# Patient Record
Sex: FEMALE | Race: BLACK OR AFRICAN AMERICAN | NOT HISPANIC OR LATINO | Employment: OTHER | RURAL
[De-identification: names, ages, dates, MRNs, and addresses within clinical notes are randomized per-mention and may not be internally consistent; named-entity substitution may affect disease eponyms.]

---

## 2018-03-06 ENCOUNTER — HISTORICAL (OUTPATIENT)
Dept: ADMINISTRATIVE | Facility: HOSPITAL | Age: 73
End: 2018-03-06

## 2018-03-07 LAB
LAB AP CLINICAL INFORMATION: NORMAL
LAB AP DIAGNOSIS - HISTORICAL: NORMAL
LAB AP GROSS PATHOLOGY - HISTORICAL: NORMAL
LAB AP SPECIMEN SUBMITTED - HISTORICAL: NORMAL

## 2018-03-27 ENCOUNTER — HISTORICAL (OUTPATIENT)
Dept: ADMINISTRATIVE | Facility: HOSPITAL | Age: 73
End: 2018-03-27

## 2018-03-28 LAB
LAB AP CLINICAL INFORMATION: NORMAL
LAB AP COMMENTS: NORMAL
LAB AP DIAGNOSIS - HISTORICAL: NORMAL
LAB AP GROSS PATHOLOGY - HISTORICAL: NORMAL
LAB AP SPECIMEN SUBMITTED - HISTORICAL: NORMAL

## 2019-03-27 ENCOUNTER — HISTORICAL (OUTPATIENT)
Dept: ADMINISTRATIVE | Facility: HOSPITAL | Age: 74
End: 2019-03-27

## 2019-03-28 LAB
LAB AP CLINICAL INFORMATION: NORMAL
LAB AP GENERAL CAT - HISTORICAL: NORMAL
LAB AP INTERPRETATION/RESULT - HISTORICAL: NEGATIVE
LAB AP SPECIMEN ADEQUACY - HISTORICAL: NORMAL
LAB AP SPECIMEN SUBMITTED - HISTORICAL: NORMAL

## 2019-07-09 ENCOUNTER — HISTORICAL (OUTPATIENT)
Dept: ADMINISTRATIVE | Facility: HOSPITAL | Age: 74
End: 2019-07-09

## 2021-01-21 ENCOUNTER — HISTORICAL (OUTPATIENT)
Dept: ADMINISTRATIVE | Facility: HOSPITAL | Age: 76
End: 2021-01-21

## 2021-01-21 LAB
ALBUMIN SERPL BCP-MCNC: 3.1 G/DL (ref 3.5–5)
ALBUMIN/GLOB SERPL: 0.6 {RATIO}
ALP SERPL-CCNC: 124 U/L (ref 55–142)
ALT SERPL W P-5'-P-CCNC: 15 U/L (ref 13–56)
ANION GAP SERPL CALCULATED.3IONS-SCNC: 14 MMOL/L
ANISOCYTOSIS BLD QL SMEAR: ABNORMAL
AST SERPL W P-5'-P-CCNC: 14 U/L (ref 15–37)
BASOPHILS # BLD AUTO: 0.04 X10E3/UL (ref 0–0.2)
BASOPHILS NFR BLD AUTO: 0.3 % (ref 0–1)
BILIRUB SERPL-MCNC: 0.2 MG/DL (ref 0–1.2)
BUN SERPL-MCNC: 26 MG/DL (ref 7–18)
BUN/CREAT SERPL: 18.1
CALCIUM SERPL-MCNC: 9.8 MG/DL (ref 8.5–10.1)
CHLORIDE SERPL-SCNC: 103 MMOL/L (ref 98–107)
CK MB SERPL-MCNC: <1 NG/ML (ref 1–3.6)
CK SERPL-CCNC: 113 U/L (ref 26–192)
CO2 SERPL-SCNC: 26 MMOL/L (ref 21–32)
CREAT SERPL-MCNC: 1.44 MG/DL (ref 0.55–1.02)
EOSINOPHIL # BLD AUTO: 0.05 X10E3/UL (ref 0–0.5)
EOSINOPHIL NFR BLD AUTO: 0.3 % (ref 1–4)
ERYTHROCYTE [DISTWIDTH] IN BLOOD BY AUTOMATED COUNT: 16.3 % (ref 11.5–14.5)
GLOBULIN SER-MCNC: 4.8 G/DL (ref 2–4)
GLUCOSE SERPL-MCNC: 122 MG/DL (ref 70–105)
GLUCOSE SERPL-MCNC: 145 MG/DL (ref 74–106)
HCT VFR BLD AUTO: 33.5 % (ref 38–47)
HCT VFR BLD AUTO: 33.9 % (ref 38–47)
HCT VFR BLD AUTO: 37.7 % (ref 38–47)
HGB BLD-MCNC: 10.7 G/DL (ref 12–16)
HGB BLD-MCNC: 9.7 G/DL (ref 12–16)
HGB BLD-MCNC: 9.7 G/DL (ref 12–16)
HYPOCHROMIA BLD QL SMEAR: ABNORMAL
IMM GRANULOCYTES # BLD AUTO: 0.07 X10E3/UL (ref 0–0.04)
IMM GRANULOCYTES NFR BLD: 0.5 % (ref 0–0.4)
LYMPHOCYTES # BLD AUTO: 1.09 X10E3/UL (ref 1–4.8)
LYMPHOCYTES NFR BLD AUTO: 7.2 % (ref 27–41)
MAGNESIUM SERPL-MCNC: 2.2 MG/DL (ref 1.7–2.3)
MCH RBC QN AUTO: 24.9 PG (ref 27–31)
MCHC RBC AUTO-ENTMCNC: 28.4 G/DL (ref 32–36)
MCHC RBC AUTO-ENTMCNC: 28.6 G/DL (ref 32–36)
MCHC RBC AUTO-ENTMCNC: 29 G/DL (ref 32–36)
MCV RBC AUTO: 87.7 FL (ref 80–96)
MONOCYTES # BLD AUTO: 0.75 X10E3/UL (ref 0–0.8)
MONOCYTES NFR BLD AUTO: 4.9 % (ref 2–6)
MPC BLD CALC-MCNC: 11.7 FL (ref 9.4–12.4)
MYOGLOBIN SERPL-MCNC: 118 NG/ML (ref 13–71)
NEUTROPHILS # BLD AUTO: 13.21 X10E3/UL (ref 1.8–7.7)
NEUTROPHILS NFR BLD AUTO: 86.8 % (ref 53–65)
NRBC # BLD AUTO: 0 X10E3/UL (ref 0–0)
NRBC, AUTO (.00): 0 /100 (ref 0–0)
NT-PROBNP SERPL-MCNC: 101 PG/ML (ref 1–125)
PLATELET # BLD AUTO: 462 X10E3/UL (ref 150–400)
PLATELET MORPHOLOGY: ABNORMAL
POLYCHROMASIA BLD QL SMEAR: ABNORMAL
POTASSIUM SERPL-SCNC: 4.5 MMOL/L (ref 3.5–5.1)
PROT SERPL-MCNC: 7.9 G/DL (ref 6.4–8.2)
RBC # BLD AUTO: 4.3 X10E6/UL (ref 4.2–5.4)
SARS-COV-2 RNA AMPLIFICATION, QUAL: NEGATIVE
SODIUM SERPL-SCNC: 138 MMOL/L (ref 136–145)
TROPONIN I SERPL-MCNC: <0.017 NG/ML (ref 0–0.06)
WBC # BLD AUTO: 15.21 X10E3/UL (ref 4.5–11)

## 2021-01-22 ENCOUNTER — HISTORICAL (OUTPATIENT)
Dept: ADMINISTRATIVE | Facility: HOSPITAL | Age: 76
End: 2021-01-22

## 2021-01-22 LAB
ABO: NORMAL
ANION GAP SERPL CALCULATED.3IONS-SCNC: 13 MMOL/L
ANTIBODY SCREEN: NORMAL
APTT PPP: 32.6 SECONDS (ref 25.2–37.3)
BASOPHILS # BLD AUTO: 0.04 X10E3/UL (ref 0–0.2)
BASOPHILS NFR BLD AUTO: 0.3 % (ref 0–1)
BUN SERPL-MCNC: 26 MG/DL (ref 7–18)
CALCIUM SERPL-MCNC: 9.3 MG/DL (ref 8.5–10.1)
CHLORIDE SERPL-SCNC: 104 MMOL/L (ref 98–107)
CO2 SERPL-SCNC: 26 MMOL/L (ref 21–32)
CREAT SERPL-MCNC: 1.66 MG/DL (ref 0.55–1.02)
EOSINOPHIL # BLD AUTO: 0.11 X10E3/UL (ref 0–0.5)
EOSINOPHIL NFR BLD AUTO: 0.7 % (ref 1–4)
ERYTHROCYTE [DISTWIDTH] IN BLOOD BY AUTOMATED COUNT: 16.2 % (ref 11.5–14.5)
GLUCOSE SERPL-MCNC: 123 MG/DL (ref 70–105)
GLUCOSE SERPL-MCNC: 126 MG/DL (ref 74–106)
GLUCOSE SERPL-MCNC: 131 MG/DL (ref 70–105)
GLUCOSE SERPL-MCNC: 137 MG/DL (ref 70–105)
HCT VFR BLD AUTO: 33.5 % (ref 38–47)
HCT VFR BLD AUTO: 34.1 % (ref 38–47)
HGB BLD-MCNC: 9.6 G/DL (ref 12–16)
HGB BLD-MCNC: 9.8 G/DL (ref 12–16)
IMM GRANULOCYTES # BLD AUTO: 0.07 X10E3/UL (ref 0–0.04)
IMM GRANULOCYTES NFR BLD: 0.5 % (ref 0–0.4)
INR BLD: 1 (ref 0–3.3)
LYMPHOCYTES # BLD AUTO: 1.55 X10E3/UL (ref 1–4.8)
LYMPHOCYTES NFR BLD AUTO: 10.1 % (ref 27–41)
MCH RBC QN AUTO: 25 PG (ref 27–31)
MCHC RBC AUTO-ENTMCNC: 28.7 G/DL (ref 32–36)
MCHC RBC AUTO-ENTMCNC: 28.7 G/DL (ref 32–36)
MCV RBC AUTO: 87.2 FL (ref 80–96)
MONOCYTES # BLD AUTO: 0.85 X10E3/UL (ref 0–0.8)
MONOCYTES NFR BLD AUTO: 5.5 % (ref 2–6)
MPC BLD CALC-MCNC: 11.5 FL (ref 9.4–12.4)
NEUTROPHILS # BLD AUTO: 12.75 X10E3/UL (ref 1.8–7.7)
NEUTROPHILS NFR BLD AUTO: 82.9 % (ref 53–65)
NRBC # BLD AUTO: 0 X10E3/UL (ref 0–0)
NRBC, AUTO (.00): 0 /100 (ref 0–0)
PLATELET # BLD AUTO: 414 X10E3/UL (ref 150–400)
POTASSIUM SERPL-SCNC: 4.4 MMOL/L (ref 3.5–5.1)
PROTHROMBIN TIME: 12.7 SECONDS (ref 11.7–14.7)
RBC # BLD AUTO: 3.84 X10E6/UL (ref 4.2–5.4)
RH TYPE: NORMAL
SODIUM SERPL-SCNC: 139 MMOL/L (ref 136–145)
WBC # BLD AUTO: 15.37 X10E3/UL (ref 4.5–11)

## 2021-01-23 ENCOUNTER — HISTORICAL (OUTPATIENT)
Dept: ADMINISTRATIVE | Facility: HOSPITAL | Age: 76
End: 2021-01-23

## 2021-01-23 LAB
ANION GAP SERPL CALCULATED.3IONS-SCNC: 15 MMOL/L
ANISOCYTOSIS BLD QL SMEAR: ABNORMAL
BASOPHILS # BLD AUTO: 0.04 X10E3/UL (ref 0–0.2)
BASOPHILS # BLD AUTO: 0.05 X10E3/UL (ref 0–0.2)
BASOPHILS NFR BLD AUTO: 0.3 % (ref 0–1)
BASOPHILS NFR BLD AUTO: 0.4 % (ref 0–1)
BUN SERPL-MCNC: 28 MG/DL (ref 7–18)
CALCIUM SERPL-MCNC: 9.4 MG/DL (ref 8.5–10.1)
CHLORIDE SERPL-SCNC: 102 MMOL/L (ref 98–107)
CO2 SERPL-SCNC: 26 MMOL/L (ref 21–32)
CREAT SERPL-MCNC: 1.76 MG/DL (ref 0.55–1.02)
EOSINOPHIL # BLD AUTO: 0.13 X10E3/UL (ref 0–0.5)
EOSINOPHIL # BLD AUTO: 0.13 X10E3/UL (ref 0–0.5)
EOSINOPHIL NFR BLD AUTO: 1.1 % (ref 1–4)
EOSINOPHIL NFR BLD AUTO: 1.1 % (ref 1–4)
ERYTHROCYTE [DISTWIDTH] IN BLOOD BY AUTOMATED COUNT: 16.3 % (ref 11.5–14.5)
ERYTHROCYTE [DISTWIDTH] IN BLOOD BY AUTOMATED COUNT: 16.4 % (ref 11.5–14.5)
GLUCOSE SERPL-MCNC: 115 MG/DL (ref 70–105)
GLUCOSE SERPL-MCNC: 129 MG/DL (ref 74–106)
GLUCOSE SERPL-MCNC: 138 MG/DL (ref 70–105)
HCT VFR BLD AUTO: 33.5 % (ref 38–47)
HCT VFR BLD AUTO: 36.8 % (ref 38–47)
HGB BLD-MCNC: 10.5 G/DL (ref 12–16)
HGB BLD-MCNC: 9.7 G/DL (ref 12–16)
HYPOCHROMIA BLD QL SMEAR: SLIGHT
IMM GRANULOCYTES # BLD AUTO: 0.04 X10E3/UL (ref 0–0.04)
IMM GRANULOCYTES # BLD AUTO: 0.06 X10E3/UL (ref 0–0.04)
IMM GRANULOCYTES NFR BLD: 0.3 % (ref 0–0.4)
IMM GRANULOCYTES NFR BLD: 0.5 % (ref 0–0.4)
LYMPHOCYTES # BLD AUTO: 1.29 X10E3/UL (ref 1–4.8)
LYMPHOCYTES # BLD AUTO: 1.55 X10E3/UL (ref 1–4.8)
LYMPHOCYTES NFR BLD AUTO: 10.9 % (ref 27–41)
LYMPHOCYTES NFR BLD AUTO: 13.5 % (ref 27–41)
MAGNESIUM SERPL-MCNC: 2 MG/DL (ref 1.7–2.3)
MCH RBC QN AUTO: 24.8 PG (ref 27–31)
MCH RBC QN AUTO: 25.1 PG (ref 27–31)
MCHC RBC AUTO-ENTMCNC: 28.5 G/DL (ref 32–36)
MCHC RBC AUTO-ENTMCNC: 29 G/DL (ref 32–36)
MCV RBC AUTO: 85.7 FL (ref 80–96)
MCV RBC AUTO: 87.8 FL (ref 80–96)
MONOCYTES # BLD AUTO: 0.59 X10E3/UL (ref 0–0.8)
MONOCYTES # BLD AUTO: 0.66 X10E3/UL (ref 0–0.8)
MONOCYTES NFR BLD AUTO: 5.1 % (ref 2–6)
MONOCYTES NFR BLD AUTO: 5.6 % (ref 2–6)
MPC BLD CALC-MCNC: 11.7 FL (ref 9.4–12.4)
MPC BLD CALC-MCNC: 11.8 FL (ref 9.4–12.4)
NEUTROPHILS # BLD AUTO: 9.13 X10E3/UL (ref 1.8–7.7)
NEUTROPHILS # BLD AUTO: 9.63 X10E3/UL (ref 1.8–7.7)
NEUTROPHILS NFR BLD AUTO: 79.6 % (ref 53–65)
NEUTROPHILS NFR BLD AUTO: 81.6 % (ref 53–65)
NRBC # BLD AUTO: 0 X10E3/UL (ref 0–0)
NRBC # BLD AUTO: 0 X10E3/UL (ref 0–0)
NRBC, AUTO (.00): 0 /100 (ref 0–0)
NRBC, AUTO (.00): 0 /100 (ref 0–0)
PLATELET # BLD AUTO: 414 X10E3/UL (ref 150–400)
PLATELET # BLD AUTO: 438 X10E3/UL (ref 150–400)
PLATELET MORPHOLOGY: ABNORMAL
POLYCHROMASIA BLD QL SMEAR: ABNORMAL
POTASSIUM SERPL-SCNC: 4.4 MMOL/L (ref 3.5–5.1)
RBC # BLD AUTO: 3.91 X10E6/UL (ref 4.2–5.4)
RBC # BLD AUTO: 4.19 X10E6/UL (ref 4.2–5.4)
SODIUM SERPL-SCNC: 139 MMOL/L (ref 136–145)
WBC # BLD AUTO: 11.49 X10E3/UL (ref 4.5–11)
WBC # BLD AUTO: 11.81 X10E3/UL (ref 4.5–11)

## 2021-01-26 LAB

## 2021-06-23 PROBLEM — K21.9 GASTROESOPHAGEAL REFLUX DISEASE: Status: ACTIVE | Noted: 2021-06-23

## 2021-06-23 PROBLEM — I10 HYPERTENSION: Status: ACTIVE | Noted: 2021-06-23

## 2021-06-23 PROBLEM — J30.9 ALLERGIC RHINITIS: Status: ACTIVE | Noted: 2021-06-23

## 2021-06-23 RX ORDER — MONTELUKAST SODIUM 10 MG/1
1 TABLET ORAL DAILY
COMMUNITY

## 2021-06-23 RX ORDER — FERROUS SULFATE 325(65) MG
1 TABLET ORAL 2 TIMES DAILY
COMMUNITY
End: 2021-10-18

## 2021-06-23 RX ORDER — LOSARTAN POTASSIUM AND HYDROCHLOROTHIAZIDE 12.5; 1 MG/1; MG/1
1 TABLET ORAL DAILY
COMMUNITY
End: 2022-02-21

## 2021-06-23 RX ORDER — THEOPHYLLINE 400 MG/1
1 TABLET, EXTENDED RELEASE ORAL DAILY
COMMUNITY

## 2021-06-23 RX ORDER — HYDROCHLOROTHIAZIDE 25 MG/1
1 TABLET ORAL DAILY
COMMUNITY

## 2021-06-23 RX ORDER — BUDESONIDE AND FORMOTEROL FUMARATE DIHYDRATE 160; 4.5 UG/1; UG/1
2 AEROSOL RESPIRATORY (INHALATION) 2 TIMES DAILY
COMMUNITY

## 2021-06-23 RX ORDER — PANTOPRAZOLE SODIUM 40 MG/1
1 TABLET, DELAYED RELEASE ORAL DAILY
COMMUNITY
End: 2021-07-27 | Stop reason: SDUPTHER

## 2021-06-23 RX ORDER — AMLODIPINE BESYLATE 5 MG/1
1 TABLET ORAL DAILY
COMMUNITY
End: 2021-10-18

## 2021-06-23 RX ORDER — ALBUTEROL SULFATE 90 UG/1
2 AEROSOL, METERED RESPIRATORY (INHALATION) EVERY 6 HOURS PRN
COMMUNITY

## 2021-06-23 RX ORDER — IPRATROPIUM BROMIDE AND ALBUTEROL SULFATE 2.5; .5 MG/3ML; MG/3ML
3 SOLUTION RESPIRATORY (INHALATION) EVERY 6 HOURS PRN
COMMUNITY

## 2021-06-24 ENCOUNTER — OFFICE VISIT (OUTPATIENT)
Dept: PRIMARY CARE CLINIC | Facility: CLINIC | Age: 76
End: 2021-06-24
Payer: COMMERCIAL

## 2021-06-24 VITALS
DIASTOLIC BLOOD PRESSURE: 58 MMHG | BODY MASS INDEX: 45.99 KG/M2 | HEIGHT: 67 IN | SYSTOLIC BLOOD PRESSURE: 116 MMHG | WEIGHT: 293 LBS | RESPIRATION RATE: 18 BRPM | HEART RATE: 82 BPM | OXYGEN SATURATION: 94 %

## 2021-06-24 DIAGNOSIS — I10 HYPERTENSION, UNSPECIFIED TYPE: Primary | ICD-10-CM

## 2021-06-24 DIAGNOSIS — K21.9 GASTROESOPHAGEAL REFLUX DISEASE, UNSPECIFIED WHETHER ESOPHAGITIS PRESENT: ICD-10-CM

## 2021-06-24 DIAGNOSIS — J30.9 ALLERGIC RHINITIS, UNSPECIFIED SEASONALITY, UNSPECIFIED TRIGGER: ICD-10-CM

## 2021-06-24 PROCEDURE — 99214 OFFICE O/P EST MOD 30 MIN: CPT | Mod: ,,, | Performed by: FAMILY MEDICINE

## 2021-06-24 PROCEDURE — 99214 PR OFFICE/OUTPT VISIT, EST, LEVL IV, 30-39 MIN: ICD-10-PCS | Mod: ,,, | Performed by: FAMILY MEDICINE

## 2021-07-27 ENCOUNTER — OFFICE VISIT (OUTPATIENT)
Dept: PRIMARY CARE CLINIC | Facility: CLINIC | Age: 76
End: 2021-07-27
Payer: COMMERCIAL

## 2021-07-27 VITALS
TEMPERATURE: 98 F | DIASTOLIC BLOOD PRESSURE: 72 MMHG | WEIGHT: 293 LBS | OXYGEN SATURATION: 94 % | SYSTOLIC BLOOD PRESSURE: 138 MMHG | HEART RATE: 97 BPM | BODY MASS INDEX: 45.99 KG/M2 | HEIGHT: 67 IN | RESPIRATION RATE: 16 BRPM

## 2021-07-27 DIAGNOSIS — E66.01 CLASS 3 SEVERE OBESITY DUE TO EXCESS CALORIES WITH SERIOUS COMORBIDITY AND BODY MASS INDEX (BMI) OF 50.0 TO 59.9 IN ADULT: ICD-10-CM

## 2021-07-27 DIAGNOSIS — I10 HYPERTENSION, UNSPECIFIED TYPE: Primary | ICD-10-CM

## 2021-07-27 DIAGNOSIS — J30.9 ALLERGIC RHINITIS, UNSPECIFIED SEASONALITY, UNSPECIFIED TRIGGER: Primary | ICD-10-CM

## 2021-07-27 DIAGNOSIS — I10 HYPERTENSION, UNSPECIFIED TYPE: ICD-10-CM

## 2021-07-27 DIAGNOSIS — K21.9 GASTROESOPHAGEAL REFLUX DISEASE, UNSPECIFIED WHETHER ESOPHAGITIS PRESENT: ICD-10-CM

## 2021-07-27 PROBLEM — E66.09 OBESITY DUE TO EXCESS CALORIES: Status: ACTIVE | Noted: 2021-07-27

## 2021-07-27 PROCEDURE — 99214 OFFICE O/P EST MOD 30 MIN: CPT | Mod: ,,, | Performed by: FAMILY MEDICINE

## 2021-07-27 PROCEDURE — 99214 PR OFFICE/OUTPT VISIT, EST, LEVL IV, 30-39 MIN: ICD-10-PCS | Mod: ,,, | Performed by: FAMILY MEDICINE

## 2021-07-27 RX ORDER — PANTOPRAZOLE SODIUM 40 MG/1
40 TABLET, DELAYED RELEASE ORAL DAILY
Qty: 90 TABLET | Refills: 3 | Status: SHIPPED | OUTPATIENT
Start: 2021-07-27 | End: 2021-10-25 | Stop reason: SDUPTHER

## 2021-07-27 RX ORDER — HYDROXYZINE PAMOATE 25 MG/1
25 CAPSULE ORAL EVERY 8 HOURS PRN
Qty: 30 CAPSULE | Refills: 11 | Status: SHIPPED | OUTPATIENT
Start: 2021-07-27

## 2021-07-27 RX ORDER — PHENTERMINE HYDROCHLORIDE 37.5 MG/1
37.5 CAPSULE ORAL EVERY MORNING
Qty: 30 CAPSULE | Refills: 0 | Status: SHIPPED | OUTPATIENT
Start: 2021-07-27 | End: 2021-08-26

## 2021-10-11 RX ORDER — PHENTERMINE HYDROCHLORIDE 37.5 MG/1
37.5 CAPSULE ORAL EVERY MORNING
Qty: 90 CAPSULE | Refills: 3 | Status: SHIPPED | OUTPATIENT
Start: 2021-10-11 | End: 2021-11-10

## 2021-10-12 ENCOUNTER — PATIENT OUTREACH (OUTPATIENT)
Dept: PRIMARY CARE CLINIC | Facility: CLINIC | Age: 76
End: 2021-10-12

## 2021-10-18 RX ORDER — FERROUS SULFATE TAB 325 MG (65 MG ELEMENTAL FE) 325 (65 FE) MG
TAB ORAL
Qty: 270 TABLET | Refills: 2 | Status: SHIPPED | OUTPATIENT
Start: 2021-10-18 | End: 2022-07-18

## 2021-10-18 RX ORDER — AMLODIPINE BESYLATE 5 MG/1
TABLET ORAL
Qty: 90 TABLET | Refills: 2 | Status: SHIPPED | OUTPATIENT
Start: 2021-10-18 | End: 2022-07-18

## 2021-10-25 ENCOUNTER — OFFICE VISIT (OUTPATIENT)
Dept: PRIMARY CARE CLINIC | Facility: CLINIC | Age: 76
End: 2021-10-25
Payer: COMMERCIAL

## 2021-10-25 VITALS
WEIGHT: 293 LBS | OXYGEN SATURATION: 95 % | HEIGHT: 67 IN | BODY MASS INDEX: 45.99 KG/M2 | HEART RATE: 94 BPM | SYSTOLIC BLOOD PRESSURE: 138 MMHG | DIASTOLIC BLOOD PRESSURE: 68 MMHG | RESPIRATION RATE: 18 BRPM

## 2021-10-25 DIAGNOSIS — J30.9 ALLERGIC RHINITIS, UNSPECIFIED SEASONALITY, UNSPECIFIED TRIGGER: Primary | ICD-10-CM

## 2021-10-25 DIAGNOSIS — E66.01 CLASS 3 SEVERE OBESITY DUE TO EXCESS CALORIES WITH SERIOUS COMORBIDITY AND BODY MASS INDEX (BMI) OF 50.0 TO 59.9 IN ADULT: ICD-10-CM

## 2021-10-25 DIAGNOSIS — Z23 NEED FOR INFLUENZA VACCINATION: ICD-10-CM

## 2021-10-25 DIAGNOSIS — K21.9 GASTROESOPHAGEAL REFLUX DISEASE, UNSPECIFIED WHETHER ESOPHAGITIS PRESENT: ICD-10-CM

## 2021-10-25 DIAGNOSIS — I10 HYPERTENSION, UNSPECIFIED TYPE: ICD-10-CM

## 2021-10-25 PROCEDURE — 99214 OFFICE O/P EST MOD 30 MIN: CPT | Mod: 25,,, | Performed by: FAMILY MEDICINE

## 2021-10-25 PROCEDURE — G0008 FLU VACCINE - QUADRIVALENT - HIGH DOSE (65+) PRESERVATIVE FREE IM: ICD-10-PCS | Mod: ,,, | Performed by: FAMILY MEDICINE

## 2021-10-25 PROCEDURE — 99214 PR OFFICE/OUTPT VISIT, EST, LEVL IV, 30-39 MIN: ICD-10-PCS | Mod: 25,,, | Performed by: FAMILY MEDICINE

## 2021-10-25 PROCEDURE — 90662 FLU VACCINE - QUADRIVALENT - HIGH DOSE (65+) PRESERVATIVE FREE IM: ICD-10-PCS | Mod: ,,, | Performed by: FAMILY MEDICINE

## 2021-10-25 PROCEDURE — 90662 IIV NO PRSV INCREASED AG IM: CPT | Mod: ,,, | Performed by: FAMILY MEDICINE

## 2021-10-25 PROCEDURE — G0008 ADMIN INFLUENZA VIRUS VAC: HCPCS | Mod: ,,, | Performed by: FAMILY MEDICINE

## 2021-10-25 RX ORDER — PANTOPRAZOLE SODIUM 40 MG/1
40 TABLET, DELAYED RELEASE ORAL DAILY
Qty: 90 TABLET | Refills: 3 | Status: SHIPPED | OUTPATIENT
Start: 2021-10-25 | End: 2022-01-20 | Stop reason: SDUPTHER

## 2021-10-25 RX ORDER — PHENTERMINE HYDROCHLORIDE 37.5 MG/1
37.5 CAPSULE ORAL EVERY MORNING
Qty: 30 CAPSULE | Refills: 1 | Status: SHIPPED | OUTPATIENT
Start: 2021-10-25 | End: 2021-11-24

## 2022-01-25 ENCOUNTER — OFFICE VISIT (OUTPATIENT)
Dept: PRIMARY CARE CLINIC | Facility: CLINIC | Age: 77
End: 2022-01-25
Payer: COMMERCIAL

## 2022-01-25 VITALS
HEART RATE: 77 BPM | WEIGHT: 293 LBS | BODY MASS INDEX: 45.99 KG/M2 | RESPIRATION RATE: 18 BRPM | OXYGEN SATURATION: 95 % | SYSTOLIC BLOOD PRESSURE: 120 MMHG | DIASTOLIC BLOOD PRESSURE: 74 MMHG | HEIGHT: 67 IN

## 2022-01-25 DIAGNOSIS — E11.9 TYPE 2 DIABETES MELLITUS WITHOUT COMPLICATION, WITHOUT LONG-TERM CURRENT USE OF INSULIN: Primary | ICD-10-CM

## 2022-01-25 DIAGNOSIS — E66.01 CLASS 3 SEVERE OBESITY DUE TO EXCESS CALORIES WITH SERIOUS COMORBIDITY AND BODY MASS INDEX (BMI) OF 50.0 TO 59.9 IN ADULT: ICD-10-CM

## 2022-01-25 DIAGNOSIS — Z23 NEED FOR INFLUENZA VACCINATION: ICD-10-CM

## 2022-01-25 DIAGNOSIS — K21.9 GASTROESOPHAGEAL REFLUX DISEASE, UNSPECIFIED WHETHER ESOPHAGITIS PRESENT: ICD-10-CM

## 2022-01-25 DIAGNOSIS — J30.9 ALLERGIC RHINITIS, UNSPECIFIED SEASONALITY, UNSPECIFIED TRIGGER: ICD-10-CM

## 2022-01-25 PROCEDURE — 1159F MED LIST DOCD IN RCRD: CPT | Mod: ,,, | Performed by: FAMILY MEDICINE

## 2022-01-25 PROCEDURE — 1159F PR MEDICATION LIST DOCUMENTED IN MEDICAL RECORD: ICD-10-PCS | Mod: ,,, | Performed by: FAMILY MEDICINE

## 2022-01-25 PROCEDURE — 99214 PR OFFICE/OUTPT VISIT, EST, LEVL IV, 30-39 MIN: ICD-10-PCS | Mod: ,,, | Performed by: FAMILY MEDICINE

## 2022-01-25 PROCEDURE — 3074F PR MOST RECENT SYSTOLIC BLOOD PRESSURE < 130 MM HG: ICD-10-PCS | Mod: ,,, | Performed by: FAMILY MEDICINE

## 2022-01-25 PROCEDURE — 1101F PT FALLS ASSESS-DOCD LE1/YR: CPT | Mod: ,,, | Performed by: FAMILY MEDICINE

## 2022-01-25 PROCEDURE — 3288F FALL RISK ASSESSMENT DOCD: CPT | Mod: ,,, | Performed by: FAMILY MEDICINE

## 2022-01-25 PROCEDURE — 1101F PR PT FALLS ASSESS DOC 0-1 FALLS W/OUT INJ PAST YR: ICD-10-PCS | Mod: ,,, | Performed by: FAMILY MEDICINE

## 2022-01-25 PROCEDURE — 3078F DIAST BP <80 MM HG: CPT | Mod: ,,, | Performed by: FAMILY MEDICINE

## 2022-01-25 PROCEDURE — 3078F PR MOST RECENT DIASTOLIC BLOOD PRESSURE < 80 MM HG: ICD-10-PCS | Mod: ,,, | Performed by: FAMILY MEDICINE

## 2022-01-25 PROCEDURE — 3288F PR FALLS RISK ASSESSMENT DOCUMENTED: ICD-10-PCS | Mod: ,,, | Performed by: FAMILY MEDICINE

## 2022-01-25 PROCEDURE — 3074F SYST BP LT 130 MM HG: CPT | Mod: ,,, | Performed by: FAMILY MEDICINE

## 2022-01-25 PROCEDURE — 99214 OFFICE O/P EST MOD 30 MIN: CPT | Mod: ,,, | Performed by: FAMILY MEDICINE

## 2022-01-25 RX ORDER — PANTOPRAZOLE SODIUM 40 MG/1
40 TABLET, DELAYED RELEASE ORAL DAILY
Qty: 90 TABLET | Refills: 3 | Status: SHIPPED | OUTPATIENT
Start: 2022-01-25 | End: 2022-04-25

## 2022-01-25 NOTE — PROGRESS NOTES
Subjective:      Patient ID: Domi Singh is a 76 y.o. female.    Chief Complaint: Follow-up (3mon. Ck-up) and Hypertension    Domi Singh a 76 y.o. female presents for follow up on all regular problems which are reviewed and discussed.     Problem List Items Addressed This Visit        ID    Need for influenza vaccination       Endocrine    Obesity due to excess calories       GI    Gastroesophageal reflux disease       Other    Allergic rhinitis      Other Visit Diagnoses     Type 2 diabetes mellitus without complication, without long-term current use of insulin    -  Primary    Relevant Orders    Hemoglobin A1C    CBC Auto Differential    Comprehensive Metabolic Panel    Lipid Panel    TSH    Urinalysis          Past Medical History:  Past Medical History:   Diagnosis Date    Allergy     GERD (gastroesophageal reflux disease)     Hypertension     Obesities, morbid      Past Surgical History:   Procedure Laterality Date    CATARACT EXTRACTION, BILATERAL       Review of patient's allergies indicates:  No Known Allergies  Current Outpatient Medications on File Prior to Visit   Medication Sig Dispense Refill    albuterol (PROVENTIL/VENTOLIN HFA) 90 mcg/actuation inhaler Inhale 2 puffs into the lungs every 6 (six) hours as needed. Rescue      albuterol-ipratropium (DUO-NEB) 2.5 mg-0.5 mg/3 mL nebulizer solution Take 3 mLs by nebulization every 6 (six) hours as needed. Rescue      amLODIPine (NORVASC) 5 MG tablet Take 1 tablet by mouth daily. 90 tablet 2    budesonide-formoterol 160-4.5 mcg (SYMBICORT) 160-4.5 mcg/actuation HFAA Inhale 2 puffs into the lungs 2 (two) times a day. Controller      ergocalciferol (ERGOCALCIFEROL) 50,000 unit Cap Take 1 capsule by mouth weekly. 12 capsule 2    FEROSUL 325 mg (65 mg iron) Tab tablet Take 1 tablet by mouth three times daily. 270 tablet 2    hydroCHLOROthiazide (HYDRODIURIL) 25 MG tablet Take 1 tablet by mouth once daily.      hydrOXYzine pamoate (VISTARIL) 25  "MG Cap Take 1 capsule (25 mg total) by mouth every 8 (eight) hours as needed (nerves). 30 capsule 11    losartan-hydrochlorothiazide 100-12.5 mg (HYZAAR) 100-12.5 mg Tab Take 1 tablet by mouth once daily.      montelukast (SINGULAIR) 10 mg tablet Take 1 tablet by mouth once daily.      theophylline 400 mg Tb24 Take 1 tablet by mouth once daily.      [DISCONTINUED] pantoprazole (PROTONIX) 40 MG tablet Take 1 tablet by mouth daily. 90 tablet 3     No current facility-administered medications on file prior to visit.     Social History     Socioeconomic History    Marital status:    Tobacco Use    Smoking status: Former Smoker     Types: Cigarettes    Smokeless tobacco: Never Used   Substance and Sexual Activity    Alcohol use: Never    Drug use: Never     Family History   Problem Relation Age of Onset    Diabetes Mother     Hypertension Mother     Diabetes Father     Hypertension Father     Diabetes Sister     Hypertension Sister     Hypertension Brother        Review of Systems   Constitutional: Negative.  Negative for activity change, appetite change, chills and diaphoresis.   HENT: Negative.  Negative for congestion, ear pain, hearing loss and postnasal drip.    Eyes: Negative for itching.   Respiratory: Negative for chest tightness and shortness of breath.    Cardiovascular: Negative for chest pain.   Gastrointestinal: Negative for abdominal pain.   Endocrine: Negative for polydipsia.   Genitourinary: Negative for frequency.   Musculoskeletal: Negative for back pain.   Neurological: Negative for headaches.       Objective:     /74 (BP Location: Left arm, Patient Position: Sitting, BP Method: X-Large (Manual))   Pulse 77   Resp 18   Ht 5' 7" (1.702 m)   Wt (!) 149.7 kg (330 lb)   SpO2 95%   BMI 51.69 kg/m²     Physical Exam  Constitutional:       Appearance: Normal appearance. She is obese.   HENT:      Head: Normocephalic and atraumatic.      Right Ear: External ear normal.      " Left Ear: External ear normal.      Nose: Nose normal.      Mouth/Throat:      Mouth: Mucous membranes are moist.      Pharynx: Oropharynx is clear.   Eyes:      Pupils: Pupils are equal, round, and reactive to light.   Cardiovascular:      Rate and Rhythm: Normal rate and regular rhythm.      Heart sounds: Normal heart sounds.   Pulmonary:      Effort: Pulmonary effort is normal.      Breath sounds: Normal breath sounds.   Abdominal:      Palpations: Abdomen is soft.   Musculoskeletal:      Cervical back: Normal range of motion and neck supple.   Skin:     General: Skin is warm and dry.   Neurological:      General: No focal deficit present.      Mental Status: She is alert and oriented to person, place, and time. Mental status is at baseline.   Psychiatric:         Mood and Affect: Mood normal.         Behavior: Behavior normal.         Thought Content: Thought content normal.         Judgment: Judgment normal.       Assessment:     1. Type 2 diabetes mellitus without complication, without long-term current use of insulin    2. Allergic rhinitis, unspecified seasonality, unspecified trigger    3. Gastroesophageal reflux disease, unspecified whether esophagitis present    4. Class 3 severe obesity due to excess calories with serious comorbidity and body mass index (BMI) of 50.0 to 59.9 in adult    5. Need for influenza vaccination        Plan:     Problem List Items Addressed This Visit        ID    Need for influenza vaccination       Endocrine    Obesity due to excess calories       GI    Gastroesophageal reflux disease       Other    Allergic rhinitis      Other Visit Diagnoses     Type 2 diabetes mellitus without complication, without long-term current use of insulin    -  Primary    Relevant Orders    Hemoglobin A1C    CBC Auto Differential    Comprehensive Metabolic Panel    Lipid Panel    TSH    Urinalysis        No follow-ups on file.    Fu + lab in 3 m  I have changed Domi Singh's pantoprazole. I am  also having her maintain her albuterol, hydroCHLOROthiazide, albuterol-ipratropium, losartan-hydrochlorothiazide 100-12.5 mg, montelukast, budesonide-formoterol 160-4.5 mcg, theophylline, hydrOXYzine pamoate, ergocalciferol, FeroSuL, and amLODIPine.    Domi was seen today for follow-up and hypertension.    Diagnoses and all orders for this visit:    Type 2 diabetes mellitus without complication, without long-term current use of insulin  -     Hemoglobin A1C; Future  -     CBC Auto Differential; Future  -     Comprehensive Metabolic Panel; Future  -     Lipid Panel; Future  -     TSH; Future  -     Urinalysis; Future    Allergic rhinitis, unspecified seasonality, unspecified trigger    Gastroesophageal reflux disease, unspecified whether esophagitis present    Class 3 severe obesity due to excess calories with serious comorbidity and body mass index (BMI) of 50.0 to 59.9 in adult    Need for influenza vaccination    Other orders  -     pantoprazole (PROTONIX) 40 MG tablet; Take 1 tablet (40 mg total) by mouth once daily.      Medications Ordered This Encounter   Medications    pantoprazole (PROTONIX) 40 MG tablet     Sig: Take 1 tablet (40 mg total) by mouth once daily.     Dispense:  90 tablet     Refill:  3     [unfilled]  Orders Placed This Encounter   Procedures    Hemoglobin A1C     Standing Status:   Future     Standing Expiration Date:   3/26/2023    CBC Auto Differential     Standing Status:   Future     Standing Expiration Date:   3/26/2023    Comprehensive Metabolic Panel     Standing Status:   Future     Standing Expiration Date:   3/26/2023    Lipid Panel     Standing Status:   Future     Standing Expiration Date:   3/26/2023    TSH     Standing Status:   Future     Standing Expiration Date:   3/26/2023    Urinalysis     Standing Status:   Future     Standing Expiration Date:   3/26/2023

## 2022-02-21 RX ORDER — LOSARTAN POTASSIUM AND HYDROCHLOROTHIAZIDE 12.5; 1 MG/1; MG/1
TABLET ORAL
Qty: 90 TABLET | Refills: 4 | Status: SHIPPED | OUTPATIENT
Start: 2022-02-21 | End: 2023-01-11

## 2022-04-25 ENCOUNTER — TELEPHONE (OUTPATIENT)
Dept: PRIMARY CARE CLINIC | Facility: CLINIC | Age: 77
End: 2022-04-25
Payer: COMMERCIAL

## 2022-04-25 ENCOUNTER — OFFICE VISIT (OUTPATIENT)
Dept: PRIMARY CARE CLINIC | Facility: CLINIC | Age: 77
End: 2022-04-25
Payer: COMMERCIAL

## 2022-04-25 VITALS
SYSTOLIC BLOOD PRESSURE: 122 MMHG | RESPIRATION RATE: 18 BRPM | HEIGHT: 67 IN | HEART RATE: 78 BPM | OXYGEN SATURATION: 95 % | BODY MASS INDEX: 45.99 KG/M2 | DIASTOLIC BLOOD PRESSURE: 60 MMHG | WEIGHT: 293 LBS

## 2022-04-25 DIAGNOSIS — J30.9 ALLERGIC RHINITIS, UNSPECIFIED SEASONALITY, UNSPECIFIED TRIGGER: ICD-10-CM

## 2022-04-25 DIAGNOSIS — I10 HYPERTENSION, UNSPECIFIED TYPE: ICD-10-CM

## 2022-04-25 DIAGNOSIS — K21.9 GASTROESOPHAGEAL REFLUX DISEASE, UNSPECIFIED WHETHER ESOPHAGITIS PRESENT: ICD-10-CM

## 2022-04-25 DIAGNOSIS — R11.2 NAUSEA AND VOMITING, INTRACTABILITY OF VOMITING NOT SPECIFIED, UNSPECIFIED VOMITING TYPE: Primary | ICD-10-CM

## 2022-04-25 PROCEDURE — 3078F DIAST BP <80 MM HG: CPT | Mod: ,,, | Performed by: FAMILY MEDICINE

## 2022-04-25 PROCEDURE — 3074F PR MOST RECENT SYSTOLIC BLOOD PRESSURE < 130 MM HG: ICD-10-PCS | Mod: ,,, | Performed by: FAMILY MEDICINE

## 2022-04-25 PROCEDURE — 99214 PR OFFICE/OUTPT VISIT, EST, LEVL IV, 30-39 MIN: ICD-10-PCS | Mod: ,,, | Performed by: FAMILY MEDICINE

## 2022-04-25 PROCEDURE — 3288F FALL RISK ASSESSMENT DOCD: CPT | Mod: ,,, | Performed by: FAMILY MEDICINE

## 2022-04-25 PROCEDURE — 1101F PT FALLS ASSESS-DOCD LE1/YR: CPT | Mod: ,,, | Performed by: FAMILY MEDICINE

## 2022-04-25 PROCEDURE — 99214 OFFICE O/P EST MOD 30 MIN: CPT | Mod: ,,, | Performed by: FAMILY MEDICINE

## 2022-04-25 PROCEDURE — 3074F SYST BP LT 130 MM HG: CPT | Mod: ,,, | Performed by: FAMILY MEDICINE

## 2022-04-25 PROCEDURE — 3078F PR MOST RECENT DIASTOLIC BLOOD PRESSURE < 80 MM HG: ICD-10-PCS | Mod: ,,, | Performed by: FAMILY MEDICINE

## 2022-04-25 PROCEDURE — 1101F PR PT FALLS ASSESS DOC 0-1 FALLS W/OUT INJ PAST YR: ICD-10-PCS | Mod: ,,, | Performed by: FAMILY MEDICINE

## 2022-04-25 PROCEDURE — 3288F PR FALLS RISK ASSESSMENT DOCUMENTED: ICD-10-PCS | Mod: ,,, | Performed by: FAMILY MEDICINE

## 2022-04-25 RX ORDER — OMEPRAZOLE 20 MG/1
20 CAPSULE, DELAYED RELEASE ORAL DAILY
Qty: 90 CAPSULE | Refills: 11 | Status: SHIPPED | OUTPATIENT
Start: 2022-04-25 | End: 2023-03-13

## 2022-04-25 NOTE — PROGRESS NOTES
Subjective:      Patient ID: Domi Singh is a 77 y.o. female.    Chief Complaint: Follow-up (3mon. Ck-up) and Diabetes    Domi Singh a 77 y.o. female presents for follow up on all regular problems which are reviewed and discussed.    n,v and fluctuating bs  Problem List Items Addressed This Visit        ENT    Allergic rhinitis       Cardiac/Vascular    Hypertension       GI    Gastroesophageal reflux disease    Nausea and vomiting - Primary    Relevant Orders    US Abdomen Complete          Past Medical History:  Past Medical History:   Diagnosis Date    Allergy     GERD (gastroesophageal reflux disease)     Hypertension     Obesities, morbid      Past Surgical History:   Procedure Laterality Date    CATARACT EXTRACTION, BILATERAL       Review of patient's allergies indicates:  No Known Allergies  Current Outpatient Medications on File Prior to Visit   Medication Sig Dispense Refill    albuterol (PROVENTIL/VENTOLIN HFA) 90 mcg/actuation inhaler Inhale 2 puffs into the lungs every 6 (six) hours as needed. Rescue      albuterol-ipratropium (DUO-NEB) 2.5 mg-0.5 mg/3 mL nebulizer solution Take 3 mLs by nebulization every 6 (six) hours as needed. Rescue      amLODIPine (NORVASC) 5 MG tablet Take 1 tablet by mouth daily. 90 tablet 2    budesonide-formoterol 160-4.5 mcg (SYMBICORT) 160-4.5 mcg/actuation HFAA Inhale 2 puffs into the lungs 2 (two) times a day. Controller      ergocalciferol (ERGOCALCIFEROL) 50,000 unit Cap Take 1 capsule by mouth weekly. 12 capsule 2    FEROSUL 325 mg (65 mg iron) Tab tablet Take 1 tablet by mouth three times daily. 270 tablet 2    hydroCHLOROthiazide (HYDRODIURIL) 25 MG tablet Take 1 tablet by mouth once daily.      hydrOXYzine pamoate (VISTARIL) 25 MG Cap Take 1 capsule (25 mg total) by mouth every 8 (eight) hours as needed (nerves). 30 capsule 11    losartan-hydrochlorothiazide 100-12.5 mg (HYZAAR) 100-12.5 mg Tab Take 1 tablet by mouth daily. 90 tablet 4     "montelukast (SINGULAIR) 10 mg tablet Take 1 tablet by mouth once daily.      theophylline 400 mg Tb24 Take 1 tablet by mouth once daily.      [DISCONTINUED] pantoprazole (PROTONIX) 40 MG tablet Take 1 tablet (40 mg total) by mouth once daily. 90 tablet 3     No current facility-administered medications on file prior to visit.     Social History     Socioeconomic History    Marital status:    Tobacco Use    Smoking status: Former Smoker     Types: Cigarettes    Smokeless tobacco: Never Used   Substance and Sexual Activity    Alcohol use: Never    Drug use: Never     Family History   Problem Relation Age of Onset    Diabetes Mother     Hypertension Mother     Diabetes Father     Hypertension Father     Diabetes Sister     Hypertension Sister     Hypertension Brother        Review of Systems   Constitutional: Negative.  Negative for activity change, appetite change, chills and diaphoresis.   HENT: Negative.  Negative for congestion, ear pain, hearing loss and postnasal drip.    Eyes: Negative for itching.   Respiratory: Negative for chest tightness and shortness of breath.    Cardiovascular: Negative for chest pain.   Gastrointestinal: Positive for nausea and vomiting. Negative for abdominal pain.   Endocrine: Negative for polydipsia.   Genitourinary: Negative for frequency.   Musculoskeletal: Negative for back pain.   Neurological: Negative for headaches.       Objective:     /60 (BP Location: Left arm, Patient Position: Sitting, BP Method: Large (Manual))   Pulse 78   Resp 18   Ht 5' 7" (1.702 m)   Wt (!) 149.7 kg (330 lb)   SpO2 95%   BMI 51.69 kg/m²     Physical Exam  Constitutional:       Appearance: Normal appearance. She is obese.   HENT:      Head: Normocephalic and atraumatic.      Right Ear: External ear normal.      Left Ear: External ear normal.      Nose: Nose normal.      Mouth/Throat:      Mouth: Mucous membranes are moist.      Pharynx: Oropharynx is clear.   Eyes:      " Pupils: Pupils are equal, round, and reactive to light.   Cardiovascular:      Rate and Rhythm: Normal rate and regular rhythm.      Heart sounds: Normal heart sounds.   Pulmonary:      Effort: Pulmonary effort is normal.      Breath sounds: Normal breath sounds.   Abdominal:      Palpations: Abdomen is soft.   Musculoskeletal:      Cervical back: Normal range of motion and neck supple.   Skin:     General: Skin is warm and dry.   Neurological:      General: No focal deficit present.      Mental Status: She is alert and oriented to person, place, and time. Mental status is at baseline.   Psychiatric:         Mood and Affect: Mood normal.         Behavior: Behavior normal.         Thought Content: Thought content normal.         Judgment: Judgment normal.       Assessment:     1. Nausea and vomiting, intractability of vomiting not specified, unspecified vomiting type    2. Gastroesophageal reflux disease, unspecified whether esophagitis present    3. Hypertension, unspecified type    4. Allergic rhinitis, unspecified seasonality, unspecified trigger        Plan:     Problem List Items Addressed This Visit        ENT    Allergic rhinitis       Cardiac/Vascular    Hypertension       GI    Gastroesophageal reflux disease    Nausea and vomiting - Primary    Relevant Orders    US Abdomen Complete        No follow-ups on file.  abd us  Fu after  Eat more protein    I have discontinued Domi Singh's pantoprazole. I am also having her start on omeprazole. Additionally, I am having her maintain her albuterol, hydroCHLOROthiazide, albuterol-ipratropium, montelukast, budesonide-formoterol 160-4.5 mcg, theophylline, hydrOXYzine pamoate, ergocalciferol, FeroSuL, amLODIPine, and losartan-hydrochlorothiazide 100-12.5 mg.    Domi was seen today for follow-up and diabetes.    Diagnoses and all orders for this visit:    Nausea and vomiting, intractability of vomiting not specified, unspecified vomiting type  -     US Abdomen  Complete; Future    Gastroesophageal reflux disease, unspecified whether esophagitis present    Hypertension, unspecified type    Allergic rhinitis, unspecified seasonality, unspecified trigger    Other orders  -     omeprazole (PRILOSEC) 20 MG capsule; Take 1 capsule (20 mg total) by mouth once daily.      Medications Ordered This Encounter   Medications    omeprazole (PRILOSEC) 20 MG capsule     Sig: Take 1 capsule (20 mg total) by mouth once daily.     Dispense:  90 capsule     Refill:  11     [unfilled]  Orders Placed This Encounter   Procedures    US Abdomen Complete     Standing Status:   Future     Standing Expiration Date:   4/25/2023     Order Specific Question:   May the Radiologist modify the order per protocol to meet the clinical needs of the patient?     Answer:   Yes     Order Specific Question:   Release to patient     Answer:   Immediate

## 2022-04-25 NOTE — TELEPHONE ENCOUNTER
----- Message from Guido Dixon III, DO sent at 4/25/2022  1:14 PM CDT -----  Labs good please let know

## 2022-04-29 ENCOUNTER — HOSPITAL ENCOUNTER (OUTPATIENT)
Dept: RADIOLOGY | Facility: HOSPITAL | Age: 77
Discharge: HOME | End: 2022-04-29
Attending: FAMILY MEDICINE
Payer: COMMERCIAL

## 2022-04-29 DIAGNOSIS — R11.2 NAUSEA AND VOMITING, INTRACTABILITY OF VOMITING NOT SPECIFIED, UNSPECIFIED VOMITING TYPE: ICD-10-CM

## 2022-04-29 PROCEDURE — 76700 US ABDOMEN COMPLETE: ICD-10-PCS | Mod: 26,,,

## 2022-04-29 PROCEDURE — 76700 US EXAM ABDOM COMPLETE: CPT | Mod: 26,,,

## 2022-04-29 PROCEDURE — 76700 US EXAM ABDOM COMPLETE: CPT | Mod: TC

## 2022-05-09 ENCOUNTER — OFFICE VISIT (OUTPATIENT)
Dept: PRIMARY CARE CLINIC | Facility: CLINIC | Age: 77
End: 2022-05-09
Payer: COMMERCIAL

## 2022-05-09 VITALS
BODY MASS INDEX: 45.99 KG/M2 | DIASTOLIC BLOOD PRESSURE: 70 MMHG | WEIGHT: 293 LBS | HEART RATE: 77 BPM | RESPIRATION RATE: 18 BRPM | HEIGHT: 67 IN | OXYGEN SATURATION: 98 % | SYSTOLIC BLOOD PRESSURE: 122 MMHG

## 2022-05-09 DIAGNOSIS — R11.2 NAUSEA AND VOMITING, INTRACTABILITY OF VOMITING NOT SPECIFIED, UNSPECIFIED VOMITING TYPE: ICD-10-CM

## 2022-05-09 DIAGNOSIS — J02.9 PHARYNGITIS, UNSPECIFIED ETIOLOGY: ICD-10-CM

## 2022-05-09 DIAGNOSIS — R10.10 PAIN OF UPPER ABDOMEN: ICD-10-CM

## 2022-05-09 DIAGNOSIS — K86.1 IDIOPATHIC CHRONIC PANCREATITIS: Primary | ICD-10-CM

## 2022-05-09 DIAGNOSIS — E66.01 CLASS 3 SEVERE OBESITY DUE TO EXCESS CALORIES WITH SERIOUS COMORBIDITY AND BODY MASS INDEX (BMI) OF 50.0 TO 59.9 IN ADULT: ICD-10-CM

## 2022-05-09 PROCEDURE — 3288F FALL RISK ASSESSMENT DOCD: CPT | Mod: ,,, | Performed by: FAMILY MEDICINE

## 2022-05-09 PROCEDURE — 3074F PR MOST RECENT SYSTOLIC BLOOD PRESSURE < 130 MM HG: ICD-10-PCS | Mod: ,,, | Performed by: FAMILY MEDICINE

## 2022-05-09 PROCEDURE — 3074F SYST BP LT 130 MM HG: CPT | Mod: ,,, | Performed by: FAMILY MEDICINE

## 2022-05-09 PROCEDURE — 3078F PR MOST RECENT DIASTOLIC BLOOD PRESSURE < 80 MM HG: ICD-10-PCS | Mod: ,,, | Performed by: FAMILY MEDICINE

## 2022-05-09 PROCEDURE — 1101F PR PT FALLS ASSESS DOC 0-1 FALLS W/OUT INJ PAST YR: ICD-10-PCS | Mod: ,,, | Performed by: FAMILY MEDICINE

## 2022-05-09 PROCEDURE — 3288F PR FALLS RISK ASSESSMENT DOCUMENTED: ICD-10-PCS | Mod: ,,, | Performed by: FAMILY MEDICINE

## 2022-05-09 PROCEDURE — 99214 PR OFFICE/OUTPT VISIT, EST, LEVL IV, 30-39 MIN: ICD-10-PCS | Mod: 25,,, | Performed by: FAMILY MEDICINE

## 2022-05-09 PROCEDURE — 96372 PR INJECTION,THERAP/PROPH/DIAG2ST, IM OR SUBCUT: ICD-10-PCS | Mod: ,,, | Performed by: FAMILY MEDICINE

## 2022-05-09 PROCEDURE — 1101F PT FALLS ASSESS-DOCD LE1/YR: CPT | Mod: ,,, | Performed by: FAMILY MEDICINE

## 2022-05-09 PROCEDURE — 99214 OFFICE O/P EST MOD 30 MIN: CPT | Mod: 25,,, | Performed by: FAMILY MEDICINE

## 2022-05-09 PROCEDURE — 3078F DIAST BP <80 MM HG: CPT | Mod: ,,, | Performed by: FAMILY MEDICINE

## 2022-05-09 PROCEDURE — 96372 THER/PROPH/DIAG INJ SC/IM: CPT | Mod: ,,, | Performed by: FAMILY MEDICINE

## 2022-05-09 RX ORDER — CEFTRIAXONE 1 G/1
1 INJECTION, POWDER, FOR SOLUTION INTRAMUSCULAR; INTRAVENOUS
Status: COMPLETED | OUTPATIENT
Start: 2022-05-09 | End: 2022-05-09

## 2022-05-09 RX ORDER — AZITHROMYCIN 250 MG/1
TABLET, FILM COATED ORAL
Qty: 6 TABLET | Refills: 1 | Status: SHIPPED | OUTPATIENT
Start: 2022-05-09 | End: 2022-05-14

## 2022-05-09 RX ORDER — CETIRIZINE HYDROCHLORIDE, PSEUDOEPHEDRINE HYDROCHLORIDE 5; 120 MG/1; MG/1
5-120 TABLET, FILM COATED, EXTENDED RELEASE ORAL 2 TIMES DAILY
Qty: 24 TABLET | Refills: 5 | Status: SHIPPED | OUTPATIENT
Start: 2022-05-09 | End: 2022-05-19

## 2022-05-09 RX ADMIN — CEFTRIAXONE 1 G: 1 INJECTION, POWDER, FOR SOLUTION INTRAMUSCULAR; INTRAVENOUS at 10:05

## 2022-05-09 NOTE — PROGRESS NOTES
Subjective:      Patient ID: Domi Singh is a 77 y.o. female.    Chief Complaint: Follow-up (Test results) and Hypertension    Domi Singh a 77 y.o. female presents for follow up on all regular problems which are reviewed and discussed.   Throat sore labs reviewed  Ultrasound reviewed  Needs ct  Problem List Items Addressed This Visit        ENT    Pharyngitis       Endocrine    Obesity due to excess calories       GI    Nausea and vomiting    Pain of upper abdomen      Other Visit Diagnoses     Idiopathic chronic pancreatitis    -  Primary    Relevant Orders    CT Abdomen Pelvis W Wo Contrast    Ambulatory referral/consult to Gastroenterology          Past Medical History:  Past Medical History:   Diagnosis Date    Allergy     GERD (gastroesophageal reflux disease)     Hypertension     Obesities, morbid      Past Surgical History:   Procedure Laterality Date    CATARACT EXTRACTION, BILATERAL       Review of patient's allergies indicates:  No Known Allergies  Current Outpatient Medications on File Prior to Visit   Medication Sig Dispense Refill    albuterol (PROVENTIL/VENTOLIN HFA) 90 mcg/actuation inhaler Inhale 2 puffs into the lungs every 6 (six) hours as needed. Rescue      albuterol-ipratropium (DUO-NEB) 2.5 mg-0.5 mg/3 mL nebulizer solution Take 3 mLs by nebulization every 6 (six) hours as needed. Rescue      amLODIPine (NORVASC) 5 MG tablet Take 1 tablet by mouth daily. 90 tablet 2    budesonide-formoterol 160-4.5 mcg (SYMBICORT) 160-4.5 mcg/actuation HFAA Inhale 2 puffs into the lungs 2 (two) times a day. Controller      ergocalciferol (ERGOCALCIFEROL) 50,000 unit Cap Take 1 capsule by mouth weekly. 12 capsule 2    FEROSUL 325 mg (65 mg iron) Tab tablet Take 1 tablet by mouth three times daily. 270 tablet 2    hydroCHLOROthiazide (HYDRODIURIL) 25 MG tablet Take 1 tablet by mouth once daily.      hydrOXYzine pamoate (VISTARIL) 25 MG Cap Take 1 capsule (25 mg total) by mouth every 8 (eight)  "hours as needed (nerves). 30 capsule 11    losartan-hydrochlorothiazide 100-12.5 mg (HYZAAR) 100-12.5 mg Tab Take 1 tablet by mouth daily. 90 tablet 4    montelukast (SINGULAIR) 10 mg tablet Take 1 tablet by mouth once daily.      omeprazole (PRILOSEC) 20 MG capsule Take 1 capsule (20 mg total) by mouth once daily. 90 capsule 11    theophylline 400 mg Tb24 Take 1 tablet by mouth once daily.       No current facility-administered medications on file prior to visit.     Social History     Socioeconomic History    Marital status:    Tobacco Use    Smoking status: Former Smoker     Types: Cigarettes    Smokeless tobacco: Never Used   Substance and Sexual Activity    Alcohol use: Never    Drug use: Never     Family History   Problem Relation Age of Onset    Diabetes Mother     Hypertension Mother     Diabetes Father     Hypertension Father     Diabetes Sister     Hypertension Sister     Hypertension Brother        Review of Systems   Constitutional: Negative.  Negative for activity change, appetite change, chills and diaphoresis.   HENT: Negative.  Negative for congestion, ear pain, hearing loss and postnasal drip.    Eyes: Negative for itching.   Respiratory: Negative for chest tightness and shortness of breath.    Cardiovascular: Negative for chest pain.   Gastrointestinal: Positive for abdominal pain.   Endocrine: Negative for polydipsia.   Genitourinary: Negative for frequency.   Musculoskeletal: Negative for back pain.   Neurological: Negative for headaches.       Objective:     /70 (BP Location: Left arm, Patient Position: Sitting, BP Method: X-Large (Manual))   Pulse 77   Resp 18   Ht 5' 7" (1.702 m)   Wt (!) 145.6 kg (321 lb)   SpO2 98%   BMI 50.28 kg/m²     Physical Exam  Constitutional:       Appearance: Normal appearance. She is obese.   HENT:      Head: Normocephalic and atraumatic.      Right Ear: External ear normal.      Left Ear: External ear normal.      Nose: Nose " normal.      Mouth/Throat:      Mouth: Mucous membranes are moist.      Pharynx: Oropharyngeal exudate and posterior oropharyngeal erythema present.   Eyes:      Pupils: Pupils are equal, round, and reactive to light.   Cardiovascular:      Rate and Rhythm: Normal rate and regular rhythm.      Heart sounds: Normal heart sounds.   Pulmonary:      Effort: Pulmonary effort is normal.      Breath sounds: Normal breath sounds.   Abdominal:      Palpations: Abdomen is soft.   Musculoskeletal:      Cervical back: Normal range of motion and neck supple.   Skin:     General: Skin is warm and dry.   Neurological:      General: No focal deficit present.      Mental Status: She is alert and oriented to person, place, and time. Mental status is at baseline.   Psychiatric:         Mood and Affect: Mood normal.         Behavior: Behavior normal.         Thought Content: Thought content normal.         Judgment: Judgment normal.       Assessment:     1. Idiopathic chronic pancreatitis    2. Pharyngitis, unspecified etiology    3. Class 3 severe obesity due to excess calories with serious comorbidity and body mass index (BMI) of 50.0 to 59.9 in adult    4. Nausea and vomiting, intractability of vomiting not specified, unspecified vomiting type    5. Pain of upper abdomen        Plan:     Problem List Items Addressed This Visit        ENT    Pharyngitis       Endocrine    Obesity due to excess calories       GI    Nausea and vomiting    Pain of upper abdomen      Other Visit Diagnoses     Idiopathic chronic pancreatitis    -  Primary    Relevant Orders    CT Abdomen Pelvis W Wo Contrast    Ambulatory referral/consult to Gastroenterology        No follow-ups on file.  Ct  Shots  rx  Gi  Fu after    I am having Domi Singh start on azithromycin and cetirizine-pseudoephedrine. I am also having her maintain her albuterol, hydroCHLOROthiazide, albuterol-ipratropium, montelukast, budesonide-formoterol 160-4.5 mcg, theophylline,  hydrOXYzine pamoate, ergocalciferol, FeroSuL, amLODIPine, losartan-hydrochlorothiazide 100-12.5 mg, and omeprazole. We administered cefTRIAXone.    Domi was seen today for follow-up and hypertension.    Diagnoses and all orders for this visit:    Idiopathic chronic pancreatitis  -     CT Abdomen Pelvis W Wo Contrast; Future  -     Ambulatory referral/consult to Gastroenterology; Future    Pharyngitis, unspecified etiology    Class 3 severe obesity due to excess calories with serious comorbidity and body mass index (BMI) of 50.0 to 59.9 in adult    Nausea and vomiting, intractability of vomiting not specified, unspecified vomiting type    Pain of upper abdomen    Other orders  -     cefTRIAXone injection 1 g  -     azithromycin (Z-JASWINDER) 250 MG tablet; Take 2 tablets by mouth on day 1; Take 1 tablet by mouth on days 2-5  -     cetirizine-pseudoephedrine 5-120 mg Tb12; Take 5-120 mg by mouth 2 (two) times daily. for 10 days      Medications Ordered This Encounter   Medications    azithromycin (Z-JASWINDER) 250 MG tablet     Sig: Take 2 tablets by mouth on day 1; Take 1 tablet by mouth on days 2-5     Dispense:  6 tablet     Refill:  1    cefTRIAXone injection 1 g    cetirizine-pseudoephedrine 5-120 mg Tb12     Sig: Take 5-120 mg by mouth 2 (two) times daily. for 10 days     Dispense:  24 tablet     Refill:  5     [unfilled]  Orders Placed This Encounter   Procedures    CT Abdomen Pelvis W Wo Contrast     Standing Status:   Future     Standing Expiration Date:   5/9/2023     Order Specific Question:   Is the patient allergic to iodine or contrast?     Answer:   No     Order Specific Question:   Is the patient on ANY Metformin drug such as Glucophage/Glucovance?           Should be off drug 48 hours after contrast. Check renal function before restart.     Answer:   Yes     Order Specific Question:   History of Kidney Disease - including: decreased kidney function, dialysis, kidney transplay, single kidney, kidney cancer,  kidney surgery?     Answer:   None     Order Specific Question:   Diabetes?     Answer:   Yes     Order Specific Question:   May the Radiologist modify the order per protocol to meet the clinical needs of the patient?     Answer:   Yes     Order Specific Question:   Oral/Rectal Contrast instructions:     Answer:   Routine Oral Contrast     Order Specific Question:   Special CT ABD Protocol Request?     Answer:   Routine    Ambulatory referral/consult to Gastroenterology     Standing Status:   Future     Standing Expiration Date:   6/9/2023     Referral Priority:   Routine     Referral Type:   Consultation     Referral Reason:   Specialty Services Required     Requested Specialty:   Gastroenterology     Number of Visits Requested:   1

## 2022-05-16 RX ORDER — ERGOCALCIFEROL 1.25 MG/1
CAPSULE ORAL
Qty: 12 CAPSULE | Refills: 0 | Status: SHIPPED | OUTPATIENT
Start: 2022-05-16 | End: 2022-08-16

## 2022-05-20 ENCOUNTER — HOSPITAL ENCOUNTER (OUTPATIENT)
Dept: RADIOLOGY | Facility: HOSPITAL | Age: 77
Discharge: HOME | End: 2022-05-20
Attending: FAMILY MEDICINE
Payer: COMMERCIAL

## 2022-05-20 DIAGNOSIS — K86.1 IDIOPATHIC CHRONIC PANCREATITIS: ICD-10-CM

## 2022-05-20 PROCEDURE — 74178 CT ABD&PLV WO CNTR FLWD CNTR: CPT | Mod: 26,,, | Performed by: STUDENT IN AN ORGANIZED HEALTH CARE EDUCATION/TRAINING PROGRAM

## 2022-05-20 PROCEDURE — 74178 CT ABDOMEN PELVIS W WO CONTRAST: ICD-10-PCS | Mod: 26,,, | Performed by: STUDENT IN AN ORGANIZED HEALTH CARE EDUCATION/TRAINING PROGRAM

## 2022-05-20 PROCEDURE — 74178 CT ABD&PLV WO CNTR FLWD CNTR: CPT | Mod: TC

## 2022-05-20 PROCEDURE — 25500020 PHARM REV CODE 255: Performed by: FAMILY MEDICINE

## 2022-05-20 RX ADMIN — IOPAMIDOL 100 ML: 755 INJECTION, SOLUTION INTRAVENOUS at 08:05

## 2022-05-26 ENCOUNTER — TELEPHONE (OUTPATIENT)
Dept: PRIMARY CARE CLINIC | Facility: CLINIC | Age: 77
End: 2022-05-26
Payer: COMMERCIAL

## 2022-05-26 DIAGNOSIS — R05.9 COUGH: ICD-10-CM

## 2022-06-01 ENCOUNTER — OFFICE VISIT (OUTPATIENT)
Dept: PRIMARY CARE CLINIC | Facility: CLINIC | Age: 77
End: 2022-06-01
Payer: COMMERCIAL

## 2022-06-01 ENCOUNTER — HOSPITAL ENCOUNTER (OUTPATIENT)
Dept: RADIOLOGY | Facility: HOSPITAL | Age: 77
Discharge: HOME | End: 2022-06-01
Attending: FAMILY MEDICINE
Payer: COMMERCIAL

## 2022-06-01 VITALS
BODY MASS INDEX: 45.99 KG/M2 | SYSTOLIC BLOOD PRESSURE: 110 MMHG | WEIGHT: 293 LBS | HEIGHT: 67 IN | OXYGEN SATURATION: 94 % | DIASTOLIC BLOOD PRESSURE: 64 MMHG | RESPIRATION RATE: 18 BRPM | HEART RATE: 65 BPM

## 2022-06-01 DIAGNOSIS — R05.9 COUGH: ICD-10-CM

## 2022-06-01 DIAGNOSIS — R10.10 PAIN OF UPPER ABDOMEN: Primary | ICD-10-CM

## 2022-06-01 DIAGNOSIS — E66.01 CLASS 3 SEVERE OBESITY DUE TO EXCESS CALORIES WITH SERIOUS COMORBIDITY AND BODY MASS INDEX (BMI) OF 50.0 TO 59.9 IN ADULT: ICD-10-CM

## 2022-06-01 DIAGNOSIS — R11.2 NAUSEA AND VOMITING, INTRACTABILITY OF VOMITING NOT SPECIFIED, UNSPECIFIED VOMITING TYPE: ICD-10-CM

## 2022-06-01 DIAGNOSIS — I10 HYPERTENSION, UNSPECIFIED TYPE: ICD-10-CM

## 2022-06-01 LAB — CREAT SERPL-MCNC: 1.4 MG/DL (ref 0.6–1.3)

## 2022-06-01 PROCEDURE — 71260 CT THORAX DX C+: CPT | Mod: 26,,, | Performed by: RADIOLOGY

## 2022-06-01 PROCEDURE — 1101F PR PT FALLS ASSESS DOC 0-1 FALLS W/OUT INJ PAST YR: ICD-10-PCS | Mod: ,,, | Performed by: FAMILY MEDICINE

## 2022-06-01 PROCEDURE — 1101F PT FALLS ASSESS-DOCD LE1/YR: CPT | Mod: ,,, | Performed by: FAMILY MEDICINE

## 2022-06-01 PROCEDURE — 3074F SYST BP LT 130 MM HG: CPT | Mod: ,,, | Performed by: FAMILY MEDICINE

## 2022-06-01 PROCEDURE — 99214 PR OFFICE/OUTPT VISIT, EST, LEVL IV, 30-39 MIN: ICD-10-PCS | Mod: ,,, | Performed by: FAMILY MEDICINE

## 2022-06-01 PROCEDURE — 71260 CT CHEST WITH CONTRAST: ICD-10-PCS | Mod: 26,,, | Performed by: RADIOLOGY

## 2022-06-01 PROCEDURE — 25500020 PHARM REV CODE 255: Performed by: FAMILY MEDICINE

## 2022-06-01 PROCEDURE — 3288F FALL RISK ASSESSMENT DOCD: CPT | Mod: ,,, | Performed by: FAMILY MEDICINE

## 2022-06-01 PROCEDURE — 3078F PR MOST RECENT DIASTOLIC BLOOD PRESSURE < 80 MM HG: ICD-10-PCS | Mod: ,,, | Performed by: FAMILY MEDICINE

## 2022-06-01 PROCEDURE — 82565 ASSAY OF CREATININE: CPT

## 2022-06-01 PROCEDURE — 71260 CT THORAX DX C+: CPT | Mod: TC

## 2022-06-01 PROCEDURE — 99214 OFFICE O/P EST MOD 30 MIN: CPT | Mod: ,,, | Performed by: FAMILY MEDICINE

## 2022-06-01 PROCEDURE — 3074F PR MOST RECENT SYSTOLIC BLOOD PRESSURE < 130 MM HG: ICD-10-PCS | Mod: ,,, | Performed by: FAMILY MEDICINE

## 2022-06-01 PROCEDURE — 3078F DIAST BP <80 MM HG: CPT | Mod: ,,, | Performed by: FAMILY MEDICINE

## 2022-06-01 PROCEDURE — 1159F PR MEDICATION LIST DOCUMENTED IN MEDICAL RECORD: ICD-10-PCS | Mod: ,,, | Performed by: FAMILY MEDICINE

## 2022-06-01 PROCEDURE — 1159F MED LIST DOCD IN RCRD: CPT | Mod: ,,, | Performed by: FAMILY MEDICINE

## 2022-06-01 PROCEDURE — 3288F PR FALLS RISK ASSESSMENT DOCUMENTED: ICD-10-PCS | Mod: ,,, | Performed by: FAMILY MEDICINE

## 2022-06-01 RX ADMIN — IOPAMIDOL 100 ML: 755 INJECTION, SOLUTION INTRAVENOUS at 01:06

## 2022-06-01 NOTE — PROGRESS NOTES
Subjective:      Patient ID: Domi Singh is a 77 y.o. female.    Chief Complaint: No chief complaint on file.    Domi Singh a 77 y.o. female presents for follow up on all regular problems which are reviewed and discussed.   Test results today-given  Stomach aches still,   declined other tests   here gives hx  Offered gi, hida, other lab  Problem List Items Addressed This Visit        Cardiac/Vascular    Hypertension       Endocrine    Obesity due to excess calories       GI    Nausea and vomiting    Pain of upper abdomen - Primary          Past Medical History:  Past Medical History:   Diagnosis Date    Allergy     GERD (gastroesophageal reflux disease)     Hypertension     Obesities, morbid      Past Surgical History:   Procedure Laterality Date    CATARACT EXTRACTION, BILATERAL       Review of patient's allergies indicates:  No Known Allergies  Current Outpatient Medications on File Prior to Visit   Medication Sig Dispense Refill    albuterol (PROVENTIL/VENTOLIN HFA) 90 mcg/actuation inhaler Inhale 2 puffs into the lungs every 6 (six) hours as needed. Rescue      albuterol-ipratropium (DUO-NEB) 2.5 mg-0.5 mg/3 mL nebulizer solution Take 3 mLs by nebulization every 6 (six) hours as needed. Rescue      amLODIPine (NORVASC) 5 MG tablet Take 1 tablet by mouth daily. 90 tablet 2    budesonide-formoterol 160-4.5 mcg (SYMBICORT) 160-4.5 mcg/actuation HFAA Inhale 2 puffs into the lungs 2 (two) times a day. Controller      ergocalciferol (ERGOCALCIFEROL) 50,000 unit Cap Take 1 capsule by mouth weekly. 12 capsule 0    FEROSUL 325 mg (65 mg iron) Tab tablet Take 1 tablet by mouth three times daily. 270 tablet 2    hydroCHLOROthiazide (HYDRODIURIL) 25 MG tablet Take 1 tablet by mouth once daily.      hydrOXYzine pamoate (VISTARIL) 25 MG Cap Take 1 capsule (25 mg total) by mouth every 8 (eight) hours as needed (nerves). 30 capsule 11    losartan-hydrochlorothiazide 100-12.5 mg (HYZAAR) 100-12.5 mg Tab  "Take 1 tablet by mouth daily. 90 tablet 4    montelukast (SINGULAIR) 10 mg tablet Take 1 tablet by mouth once daily.      omeprazole (PRILOSEC) 20 MG capsule Take 1 capsule (20 mg total) by mouth once daily. 90 capsule 11    theophylline 400 mg Tb24 Take 1 tablet by mouth once daily.       No current facility-administered medications on file prior to visit.     Social History     Socioeconomic History    Marital status:    Tobacco Use    Smoking status: Former Smoker     Types: Cigarettes    Smokeless tobacco: Never Used   Substance and Sexual Activity    Alcohol use: Never    Drug use: Never     Family History   Problem Relation Age of Onset    Diabetes Mother     Hypertension Mother     Diabetes Father     Hypertension Father     Diabetes Sister     Hypertension Sister     Hypertension Brother        Review of Systems   Constitutional: Negative.  Negative for activity change, appetite change, chills and diaphoresis.   HENT: Negative.  Negative for congestion, ear pain, hearing loss and postnasal drip.    Eyes: Negative for itching.   Respiratory: Negative for chest tightness and shortness of breath.    Cardiovascular: Negative for chest pain.   Gastrointestinal: Positive for abdominal pain.   Endocrine: Negative for polydipsia.   Genitourinary: Negative for frequency.   Musculoskeletal: Negative for back pain.   Neurological: Negative for headaches.       Objective:     /64 (BP Location: Left arm, Patient Position: Sitting, BP Method: Large (Manual))   Pulse 65   Resp 18   Ht 5' 7" (1.702 m)   Wt (!) 149.7 kg (330 lb)   SpO2 (!) 94%   BMI 51.69 kg/m²     Physical Exam  Constitutional:       Appearance: Normal appearance. She is obese.   HENT:      Head: Normocephalic and atraumatic.      Right Ear: External ear normal.      Left Ear: External ear normal.      Nose: Nose normal.      Mouth/Throat:      Mouth: Mucous membranes are moist.      Pharynx: Oropharynx is clear.   Eyes:    "   Pupils: Pupils are equal, round, and reactive to light.   Cardiovascular:      Rate and Rhythm: Normal rate and regular rhythm.      Heart sounds: Normal heart sounds. No murmur heard.    No gallop.   Pulmonary:      Effort: Pulmonary effort is normal. No respiratory distress.      Breath sounds: Normal breath sounds. No wheezing.   Abdominal:      General: Bowel sounds are normal.      Palpations: Abdomen is soft.   Musculoskeletal:      Cervical back: Normal range of motion and neck supple.   Skin:     General: Skin is warm and dry.   Neurological:      General: No focal deficit present.      Mental Status: She is alert and oriented to person, place, and time. Mental status is at baseline.   Psychiatric:         Mood and Affect: Mood normal.         Behavior: Behavior normal.         Thought Content: Thought content normal.         Judgment: Judgment normal.       Assessment:     1. Pain of upper abdomen    2. Nausea and vomiting, intractability of vomiting not specified, unspecified vomiting type    3. Class 3 severe obesity due to excess calories with serious comorbidity and body mass index (BMI) of 50.0 to 59.9 in adult    4. Hypertension, unspecified type        Plan:     Problem List Items Addressed This Visit        Cardiac/Vascular    Hypertension       Endocrine    Obesity due to excess calories       GI    Nausea and vomiting    Pain of upper abdomen - Primary        No follow-ups on file.  Has oct. Fu.  Do lab then  If worsens call or er.      I am having Domi Singh maintain her albuterol, hydroCHLOROthiazide, albuterol-ipratropium, montelukast, budesonide-formoterol 160-4.5 mcg, theophylline, hydrOXYzine pamoate, FeroSuL, amLODIPine, losartan-hydrochlorothiazide 100-12.5 mg, omeprazole, and ergocalciferol.    Diagnoses and all orders for this visit:    Pain of upper abdomen    Nausea and vomiting, intractability of vomiting not specified, unspecified vomiting type    Class 3 severe obesity due to  excess calories with serious comorbidity and body mass index (BMI) of 50.0 to 59.9 in adult    Hypertension, unspecified type         [unfilled]  No orders of the defined types were placed in this encounter.

## 2022-07-18 RX ORDER — FERROUS SULFATE TAB 325 MG (65 MG ELEMENTAL FE) 325 (65 FE) MG
TAB ORAL
Qty: 270 TABLET | Refills: 0 | Status: SHIPPED | OUTPATIENT
Start: 2022-07-18 | End: 2022-10-17

## 2022-07-18 RX ORDER — AMLODIPINE BESYLATE 5 MG/1
TABLET ORAL
Qty: 90 TABLET | Refills: 0 | Status: SHIPPED | OUTPATIENT
Start: 2022-07-18 | End: 2022-10-17

## 2022-08-16 RX ORDER — ERGOCALCIFEROL 1.25 MG/1
CAPSULE ORAL
Qty: 12 CAPSULE | Refills: 0 | Status: SHIPPED | OUTPATIENT
Start: 2022-08-16 | End: 2022-11-14

## 2022-10-25 ENCOUNTER — OFFICE VISIT (OUTPATIENT)
Dept: PRIMARY CARE CLINIC | Facility: CLINIC | Age: 77
End: 2022-10-25
Payer: COMMERCIAL

## 2022-10-25 VITALS
OXYGEN SATURATION: 97 % | RESPIRATION RATE: 18 BRPM | HEIGHT: 67 IN | WEIGHT: 293 LBS | DIASTOLIC BLOOD PRESSURE: 60 MMHG | HEART RATE: 85 BPM | SYSTOLIC BLOOD PRESSURE: 118 MMHG | BODY MASS INDEX: 45.99 KG/M2

## 2022-10-25 DIAGNOSIS — K21.9 GASTROESOPHAGEAL REFLUX DISEASE, UNSPECIFIED WHETHER ESOPHAGITIS PRESENT: ICD-10-CM

## 2022-10-25 DIAGNOSIS — R10.9 ABDOMINAL PAIN, UNSPECIFIED ABDOMINAL LOCATION: ICD-10-CM

## 2022-10-25 DIAGNOSIS — R10.10 PAIN OF UPPER ABDOMEN: ICD-10-CM

## 2022-10-25 DIAGNOSIS — Z23 NEED FOR VACCINATION: Primary | ICD-10-CM

## 2022-10-25 DIAGNOSIS — R11.2 NAUSEA AND VOMITING, UNSPECIFIED VOMITING TYPE: ICD-10-CM

## 2022-10-25 DIAGNOSIS — I10 HYPERTENSION, UNSPECIFIED TYPE: ICD-10-CM

## 2022-10-25 PROCEDURE — 90694 VACC AIIV4 NO PRSRV 0.5ML IM: CPT | Mod: ,,, | Performed by: FAMILY MEDICINE

## 2022-10-25 PROCEDURE — 1101F PR PT FALLS ASSESS DOC 0-1 FALLS W/OUT INJ PAST YR: ICD-10-PCS | Mod: ,,, | Performed by: FAMILY MEDICINE

## 2022-10-25 PROCEDURE — 1159F MED LIST DOCD IN RCRD: CPT | Mod: ,,, | Performed by: FAMILY MEDICINE

## 2022-10-25 PROCEDURE — 3078F DIAST BP <80 MM HG: CPT | Mod: ,,, | Performed by: FAMILY MEDICINE

## 2022-10-25 PROCEDURE — G0008 ADMIN INFLUENZA VIRUS VAC: HCPCS | Mod: ,,, | Performed by: FAMILY MEDICINE

## 2022-10-25 PROCEDURE — 3288F PR FALLS RISK ASSESSMENT DOCUMENTED: ICD-10-PCS | Mod: ,,, | Performed by: FAMILY MEDICINE

## 2022-10-25 PROCEDURE — 3288F FALL RISK ASSESSMENT DOCD: CPT | Mod: ,,, | Performed by: FAMILY MEDICINE

## 2022-10-25 PROCEDURE — 3074F PR MOST RECENT SYSTOLIC BLOOD PRESSURE < 130 MM HG: ICD-10-PCS | Mod: ,,, | Performed by: FAMILY MEDICINE

## 2022-10-25 PROCEDURE — 1101F PT FALLS ASSESS-DOCD LE1/YR: CPT | Mod: ,,, | Performed by: FAMILY MEDICINE

## 2022-10-25 PROCEDURE — 99214 OFFICE O/P EST MOD 30 MIN: CPT | Mod: ,,, | Performed by: FAMILY MEDICINE

## 2022-10-25 PROCEDURE — 1159F PR MEDICATION LIST DOCUMENTED IN MEDICAL RECORD: ICD-10-PCS | Mod: ,,, | Performed by: FAMILY MEDICINE

## 2022-10-25 PROCEDURE — 90694 FLU VACCINE - QUADRIVALENT - ADJUVANTED: ICD-10-PCS | Mod: ,,, | Performed by: FAMILY MEDICINE

## 2022-10-25 PROCEDURE — 3078F PR MOST RECENT DIASTOLIC BLOOD PRESSURE < 80 MM HG: ICD-10-PCS | Mod: ,,, | Performed by: FAMILY MEDICINE

## 2022-10-25 PROCEDURE — 3074F SYST BP LT 130 MM HG: CPT | Mod: ,,, | Performed by: FAMILY MEDICINE

## 2022-10-25 PROCEDURE — 99214 PR OFFICE/OUTPT VISIT, EST, LEVL IV, 30-39 MIN: ICD-10-PCS | Mod: ,,, | Performed by: FAMILY MEDICINE

## 2022-10-25 PROCEDURE — G0008 FLU VACCINE - QUADRIVALENT - ADJUVANTED: ICD-10-PCS | Mod: ,,, | Performed by: FAMILY MEDICINE

## 2022-10-25 NOTE — PROGRESS NOTES
Subjective:      Patient ID: Domi Singh is a 77 y.o. female.    Chief Complaint: Follow-up (6mon. Ck-up)    Domi Singh a 77 y.o. female presents for follow up on all regular problems which are reviewed and discussed.   Stomach pain  Problem List Items Addressed This Visit          Cardiac/Vascular    Hypertension       GI    Gastroesophageal reflux disease    Nausea and vomiting    Pain of upper abdomen     Other Visit Diagnoses       Need for vaccination    -  Primary    Relevant Orders    Influenza (FLUAD) - Quadrivalent (Adjuvanted) *Preferred* (65+) (PF) (Completed)    Abdominal pain, unspecified abdominal location        Relevant Orders    Ambulatory referral/consult to Gastroenterology            Past Medical History:  Past Medical History:   Diagnosis Date    Allergy     GERD (gastroesophageal reflux disease)     Hypertension     Obesities, morbid      Past Surgical History:   Procedure Laterality Date    CATARACT EXTRACTION, BILATERAL       Review of patient's allergies indicates:  No Known Allergies  Current Outpatient Medications on File Prior to Visit   Medication Sig Dispense Refill    albuterol (PROVENTIL/VENTOLIN HFA) 90 mcg/actuation inhaler Inhale 2 puffs into the lungs every 6 (six) hours as needed. Rescue      albuterol-ipratropium (DUO-NEB) 2.5 mg-0.5 mg/3 mL nebulizer solution Take 3 mLs by nebulization every 6 (six) hours as needed. Rescue      amLODIPine (NORVASC) 5 MG tablet Take 1 tablet by mouth daily. 90 tablet 0    budesonide-formoterol 160-4.5 mcg (SYMBICORT) 160-4.5 mcg/actuation HFAA Inhale 2 puffs into the lungs 2 (two) times a day. Controller      ergocalciferol (ERGOCALCIFEROL) 50,000 unit Cap Take 1 capsule by mouth weekly. 12 capsule 0    FEROSUL 325 mg (65 mg iron) Tab tablet Take 1 tablet by mouth three times daily. 270 tablet 0    hydroCHLOROthiazide (HYDRODIURIL) 25 MG tablet Take 1 tablet by mouth once daily.      hydrOXYzine pamoate (VISTARIL) 25 MG Cap Take 1 capsule  "(25 mg total) by mouth every 8 (eight) hours as needed (nerves). 30 capsule 11    losartan-hydrochlorothiazide 100-12.5 mg (HYZAAR) 100-12.5 mg Tab Take 1 tablet by mouth daily. 90 tablet 4    montelukast (SINGULAIR) 10 mg tablet Take 1 tablet by mouth once daily.      omeprazole (PRILOSEC) 20 MG capsule Take 1 capsule (20 mg total) by mouth once daily. 90 capsule 11    theophylline 400 mg Tb24 Take 1 tablet by mouth once daily.       No current facility-administered medications on file prior to visit.     Social History     Socioeconomic History    Marital status:    Tobacco Use    Smoking status: Former     Packs/day: 1.50     Years: 36.00     Pack years: 54.00     Types: Cigarettes     Quit date:      Years since quittin.8    Smokeless tobacco: Never   Substance and Sexual Activity    Alcohol use: Never    Drug use: Never     Family History   Problem Relation Age of Onset    Diabetes Mother     Hypertension Mother     Diabetes Father     Hypertension Father     Diabetes Sister     Hypertension Sister     Hypertension Brother        Review of Systems   Constitutional: Negative.  Negative for activity change, appetite change, chills and diaphoresis.   HENT: Negative.  Negative for congestion, ear pain, hearing loss and postnasal drip.    Eyes:  Negative for itching.   Respiratory:  Negative for chest tightness and shortness of breath.    Cardiovascular:  Negative for chest pain.   Gastrointestinal:  Positive for abdominal pain. Negative for abdominal distention, anal bleeding and blood in stool.   Endocrine: Negative for polydipsia.   Genitourinary:  Negative for frequency.   Musculoskeletal:  Negative for back pain.   Neurological:  Negative for headaches.     Objective:     /60 (BP Location: Left arm, Patient Position: Sitting, BP Method: Large (Manual))   Pulse 85   Resp 18   Ht 5' 7" (1.702 m)   Wt (!) 147.4 kg (325 lb)   SpO2 97%   BMI 50.90 kg/m²     Physical Exam  Constitutional:  "      Appearance: Normal appearance. She is obese.   HENT:      Head: Normocephalic and atraumatic.      Right Ear: External ear normal.      Left Ear: External ear normal.      Nose: Nose normal.      Mouth/Throat:      Mouth: Mucous membranes are moist.      Pharynx: Oropharynx is clear.   Eyes:      Pupils: Pupils are equal, round, and reactive to light.   Cardiovascular:      Rate and Rhythm: Normal rate and regular rhythm.      Heart sounds: Normal heart sounds.   Pulmonary:      Effort: Pulmonary effort is normal.      Breath sounds: Normal breath sounds.   Abdominal:      Palpations: Abdomen is soft.   Musculoskeletal:      Cervical back: Normal range of motion and neck supple.   Skin:     General: Skin is warm and dry.   Neurological:      General: No focal deficit present.      Mental Status: She is alert and oriented to person, place, and time. Mental status is at baseline.   Psychiatric:         Mood and Affect: Mood normal.         Behavior: Behavior normal.         Thought Content: Thought content normal.         Judgment: Judgment normal.       1. Need for vaccination    2. Abdominal pain, unspecified abdominal location    3. Gastroesophageal reflux disease, unspecified whether esophagitis present    4. Nausea and vomiting, unspecified vomiting type    5. Pain of upper abdomen    6. Hypertension, unspecified type        Plan:     Problem List Items Addressed This Visit          Cardiac/Vascular    Hypertension       GI    Gastroesophageal reflux disease    Nausea and vomiting    Pain of upper abdomen     Other Visit Diagnoses       Need for vaccination    -  Primary    Relevant Orders    Influenza (FLUAD) - Quadrivalent (Adjuvanted) *Preferred* (65+) (PF) (Completed)    Abdominal pain, unspecified abdominal location        Relevant Orders    Ambulatory referral/consult to Gastroenterology          No follow-ups on file.  Gi  6m fu    I am having Domi Singh maintain her albuterol,  hydroCHLOROthiazide, albuterol-ipratropium, montelukast, budesonide-formoterol 160-4.5 mcg, theophylline, hydrOXYzine pamoate, losartan-hydrochlorothiazide 100-12.5 mg, omeprazole, ergocalciferol, amLODIPine, and FeroSuL.    Domi was seen today for follow-up.    Diagnoses and all orders for this visit:    Need for vaccination  -     Influenza (FLUAD) - Quadrivalent (Adjuvanted) *Preferred* (65+) (PF)    Abdominal pain, unspecified abdominal location  -     Ambulatory referral/consult to Gastroenterology; Future    Gastroesophageal reflux disease, unspecified whether esophagitis present    Nausea and vomiting, unspecified vomiting type    Pain of upper abdomen    Hypertension, unspecified type       [unfilled]  Orders Placed This Encounter   Procedures    Influenza (FLUAD) - Quadrivalent (Adjuvanted) *Preferred* (65+) (PF)    Ambulatory referral/consult to Gastroenterology     Standing Status:   Future     Standing Expiration Date:   11/25/2023     Referral Priority:   Routine     Referral Type:   Consultation     Referral Reason:   Specialty Services Required     Requested Specialty:   Gastroenterology     Number of Visits Requested:   1

## 2022-11-07 ENCOUNTER — TELEPHONE (OUTPATIENT)
Dept: GASTROENTEROLOGY | Facility: CLINIC | Age: 77
End: 2022-11-07
Payer: COMMERCIAL

## 2022-11-07 ENCOUNTER — HOSPITAL ENCOUNTER (OUTPATIENT)
Dept: RADIOLOGY | Facility: HOSPITAL | Age: 77
Discharge: HOME | End: 2022-11-07
Attending: NURSE PRACTITIONER
Payer: COMMERCIAL

## 2022-11-07 ENCOUNTER — OFFICE VISIT (OUTPATIENT)
Dept: GASTROENTEROLOGY | Facility: CLINIC | Age: 77
End: 2022-11-07
Payer: COMMERCIAL

## 2022-11-07 VITALS
HEART RATE: 82 BPM | OXYGEN SATURATION: 97 % | BODY MASS INDEX: 45.99 KG/M2 | WEIGHT: 293 LBS | HEIGHT: 67 IN | DIASTOLIC BLOOD PRESSURE: 59 MMHG | SYSTOLIC BLOOD PRESSURE: 104 MMHG

## 2022-11-07 DIAGNOSIS — K55.1 CHRONIC VASCULAR DISORDERS OF INTESTINE: ICD-10-CM

## 2022-11-07 DIAGNOSIS — K55.9 ISCHEMIC COLITIS: ICD-10-CM

## 2022-11-07 DIAGNOSIS — K59.04 CHRONIC IDIOPATHIC CONSTIPATION: ICD-10-CM

## 2022-11-07 DIAGNOSIS — R10.9 ABDOMINAL PAIN, UNSPECIFIED ABDOMINAL LOCATION: ICD-10-CM

## 2022-11-07 DIAGNOSIS — D50.9 IRON DEFICIENCY ANEMIA, UNSPECIFIED IRON DEFICIENCY ANEMIA TYPE: ICD-10-CM

## 2022-11-07 DIAGNOSIS — K59.04 CHRONIC IDIOPATHIC CONSTIPATION: Primary | ICD-10-CM

## 2022-11-07 PROCEDURE — 3078F PR MOST RECENT DIASTOLIC BLOOD PRESSURE < 80 MM HG: ICD-10-PCS | Mod: CPTII,,, | Performed by: NURSE PRACTITIONER

## 2022-11-07 PROCEDURE — 1159F MED LIST DOCD IN RCRD: CPT | Mod: CPTII,,, | Performed by: NURSE PRACTITIONER

## 2022-11-07 PROCEDURE — 1101F PR PT FALLS ASSESS DOC 0-1 FALLS W/OUT INJ PAST YR: ICD-10-PCS | Mod: CPTII,,, | Performed by: NURSE PRACTITIONER

## 2022-11-07 PROCEDURE — 1159F PR MEDICATION LIST DOCUMENTED IN MEDICAL RECORD: ICD-10-PCS | Mod: CPTII,,, | Performed by: NURSE PRACTITIONER

## 2022-11-07 PROCEDURE — 3078F DIAST BP <80 MM HG: CPT | Mod: CPTII,,, | Performed by: NURSE PRACTITIONER

## 2022-11-07 PROCEDURE — 74018 RADEX ABDOMEN 1 VIEW: CPT | Mod: TC

## 2022-11-07 PROCEDURE — 74018 XR KUB: ICD-10-PCS | Mod: 26,,, | Performed by: RADIOLOGY

## 2022-11-07 PROCEDURE — 1101F PT FALLS ASSESS-DOCD LE1/YR: CPT | Mod: CPTII,,, | Performed by: NURSE PRACTITIONER

## 2022-11-07 PROCEDURE — 3288F FALL RISK ASSESSMENT DOCD: CPT | Mod: CPTII,,, | Performed by: NURSE PRACTITIONER

## 2022-11-07 PROCEDURE — 99214 OFFICE O/P EST MOD 30 MIN: CPT | Mod: ,,, | Performed by: NURSE PRACTITIONER

## 2022-11-07 PROCEDURE — 99214 PR OFFICE/OUTPT VISIT, EST, LEVL IV, 30-39 MIN: ICD-10-PCS | Mod: ,,, | Performed by: NURSE PRACTITIONER

## 2022-11-07 PROCEDURE — 3288F PR FALLS RISK ASSESSMENT DOCUMENTED: ICD-10-PCS | Mod: CPTII,,, | Performed by: NURSE PRACTITIONER

## 2022-11-07 PROCEDURE — 3074F SYST BP LT 130 MM HG: CPT | Mod: CPTII,,, | Performed by: NURSE PRACTITIONER

## 2022-11-07 PROCEDURE — 74018 RADEX ABDOMEN 1 VIEW: CPT | Mod: 26,,, | Performed by: RADIOLOGY

## 2022-11-07 PROCEDURE — 3074F PR MOST RECENT SYSTOLIC BLOOD PRESSURE < 130 MM HG: ICD-10-PCS | Mod: CPTII,,, | Performed by: NURSE PRACTITIONER

## 2022-11-07 NOTE — TELEPHONE ENCOUNTER
Called patient with appointment for CTA abdomen for 11/15/22 at 8:30am. Patient verbalized understanding.          ----- Message from NATHAN Burk sent at 11/7/2022 11:03 AM CST -----  Normal X-Ray. No heavy stool burden noted. If she'd like to go ahead and schedule a CTA to check for ischemic colitis due to her history of this, we can schedule. I will place the order.

## 2022-11-07 NOTE — PATIENT INSTRUCTIONS
Miralax 1-2 capfuls daily x2 weeks    Call in 2 weeks [phone] for CTA (cat scan) if no better

## 2022-11-07 NOTE — TELEPHONE ENCOUNTER
Results called to patient. Patient does wish to have CTA. Instructed patient I would call and schedule this and give her a call back.        ----- Message from NATHAN Burk sent at 11/7/2022 11:03 AM CST -----  Normal X-Ray. No heavy stool burden noted. If she'd like to go ahead and schedule a CTA to check for ischemic colitis due to her history of this, we can schedule. I will place the order.

## 2022-11-07 NOTE — TELEPHONE ENCOUNTER
Results and recommendations called to patient. Verbalized understanding.              ----- Message from NATHAN Burk sent at 11/7/2022  1:33 PM CST -----  Continue PO iron since not constipated on KUB and iron still low

## 2022-11-07 NOTE — PROGRESS NOTES
Pt is a 78 y/o BF here for lower abd pain x1+ year. Pain worse w/ eating. C/o chronic constipation. Denies N/V/D, hematochezia, or melena. H/o pancreatitis but this pain is lower not upper, feels different.   Colonoscopy 2021 by Dr. Verduzco showed ischemic colitis.   Hx CARMELO, on iron pills, no recent iron studies.    Past Medical History:   Diagnosis Date    Allergy     GERD (gastroesophageal reflux disease)     Hypertension     Obesities, morbid      Review of Systems   Constitutional:  Negative for chills and fever.   Respiratory:  Negative for shortness of breath.    Cardiovascular:  Negative for chest pain.   Gastrointestinal:  Positive for abdominal pain and constipation. Negative for blood in stool, diarrhea, melena, nausea and vomiting.   Skin:  Negative for rash.   Neurological:  Negative for weakness.     Physical Exam  Vitals reviewed. Exam conducted with a chaperone present.   Constitutional:       General: She is not in acute distress.     Appearance: Normal appearance. She is obese.   HENT:      Head: Normocephalic.   Eyes:      General: No scleral icterus.     Pupils: Pupils are equal, round, and reactive to light.   Cardiovascular:      Rate and Rhythm: Normal rate.   Pulmonary:      Effort: Pulmonary effort is normal.   Abdominal:      General: There is no distension.      Palpations: Abdomen is soft.      Tenderness: There is no abdominal tenderness. There is no guarding or rebound.   Skin:     General: Skin is warm and dry.   Neurological:      General: No focal deficit present.      Mental Status: She is alert and oriented to person, place, and time. Mental status is at baseline.   Psychiatric:         Mood and Affect: Mood normal.         Behavior: Behavior normal.         Thought Content: Thought content normal.         Judgment: Judgment normal.     Plan  -KUB for stool burden  -Miralax 1 capful daily   -CBC, iron/ferritin - may check on stopping PO iron 2/2 to constipation, if still needs iron,  would consider IV  -offered pt CTA for h/o ischemic colitis, creat 1.4, d/w pt risks vs benefits - will try conservative constipation tx 1st & reassess in 2 wks - pt to call if no better & proceed w/ CTA  -RTC 3 months, sooner PRN

## 2022-11-07 NOTE — TELEPHONE ENCOUNTER
Attempted to call patient with appointment for CTA abdomen/pelvis for 11/15/22 at 8:30am. No answer or voicemail available.          ----- Message from NATHAN Burk sent at 11/7/2022 11:03 AM CST -----  Normal X-Ray. No heavy stool burden noted. If she'd like to go ahead and schedule a CTA to check for ischemic colitis due to her history of this, we can schedule. I will place the order.

## 2022-11-08 ENCOUNTER — TELEPHONE (OUTPATIENT)
Dept: PRIMARY CARE CLINIC | Facility: CLINIC | Age: 77
End: 2022-11-08
Payer: COMMERCIAL

## 2022-11-08 RX ORDER — PRAVASTATIN SODIUM 20 MG/1
20 TABLET ORAL DAILY
Qty: 90 TABLET | Refills: 3 | Status: SHIPPED | OUTPATIENT
Start: 2022-11-08 | End: 2023-11-08

## 2022-11-09 DIAGNOSIS — Z71.89 COMPLEX CARE COORDINATION: ICD-10-CM

## 2022-11-14 RX ORDER — ERGOCALCIFEROL 1.25 MG/1
50000 CAPSULE ORAL
Qty: 12 CAPSULE | Refills: 0 | Status: SHIPPED | OUTPATIENT
Start: 2022-11-14 | End: 2023-04-25 | Stop reason: ALTCHOICE

## 2022-11-15 ENCOUNTER — HOSPITAL ENCOUNTER (OUTPATIENT)
Dept: RADIOLOGY | Facility: HOSPITAL | Age: 77
Discharge: HOME | End: 2022-11-15
Attending: NURSE PRACTITIONER
Payer: COMMERCIAL

## 2022-11-15 ENCOUNTER — TELEPHONE (OUTPATIENT)
Dept: GASTROENTEROLOGY | Facility: CLINIC | Age: 77
End: 2022-11-15
Payer: COMMERCIAL

## 2022-11-15 DIAGNOSIS — K55.1 CHRONIC VASCULAR DISORDERS OF INTESTINE: ICD-10-CM

## 2022-11-15 DIAGNOSIS — K55.9 ISCHEMIC COLITIS: Primary | ICD-10-CM

## 2022-11-15 PROCEDURE — 74174 CTA ABD&PLVS W/CONTRAST: CPT | Mod: 26,,, | Performed by: STUDENT IN AN ORGANIZED HEALTH CARE EDUCATION/TRAINING PROGRAM

## 2022-11-15 PROCEDURE — 74174 CTA ABD&PLVS W/CONTRAST: CPT | Mod: TC

## 2022-11-15 PROCEDURE — 25500020 PHARM REV CODE 255: Performed by: NURSE PRACTITIONER

## 2022-11-15 PROCEDURE — 74174 CTA ABDOMEN AND PELVIS: ICD-10-PCS | Mod: 26,,, | Performed by: STUDENT IN AN ORGANIZED HEALTH CARE EDUCATION/TRAINING PROGRAM

## 2022-11-15 RX ADMIN — IOPAMIDOL 100 ML: 755 INJECTION, SOLUTION INTRAVENOUS at 08:11

## 2022-11-15 NOTE — TELEPHONE ENCOUNTER
Results and recommendations called to patient. Agreeable to surgery referral.            ----- Message from NATHAN Burk sent at 11/15/2022 10:52 AM CST -----  Please let patient know her CTA showed some mild narrowing in some of the arteries of the intestine. Since she has hx of ischemic colitis on 2021 c-scope with [facility], let's have her f/u with Dr. Moreira. I will place this referral order now.

## 2022-12-07 ENCOUNTER — OFFICE VISIT (OUTPATIENT)
Dept: SURGERY | Facility: CLINIC | Age: 77
End: 2022-12-07
Payer: COMMERCIAL

## 2022-12-07 VITALS — BODY MASS INDEX: 45.99 KG/M2 | WEIGHT: 293 LBS | HEIGHT: 67 IN

## 2022-12-07 DIAGNOSIS — G89.29 CHRONIC ABDOMINAL PAIN: ICD-10-CM

## 2022-12-07 DIAGNOSIS — R10.10 UPPER ABDOMINAL PAIN, UNSPECIFIED: ICD-10-CM

## 2022-12-07 DIAGNOSIS — R10.9 CHRONIC ABDOMINAL PAIN: ICD-10-CM

## 2022-12-07 DIAGNOSIS — E66.01 CLASS 3 SEVERE OBESITY DUE TO EXCESS CALORIES WITH SERIOUS COMORBIDITY AND BODY MASS INDEX (BMI) OF 50.0 TO 59.9 IN ADULT: Primary | ICD-10-CM

## 2022-12-07 PROCEDURE — 1159F PR MEDICATION LIST DOCUMENTED IN MEDICAL RECORD: ICD-10-PCS | Mod: CPTII,,, | Performed by: SURGERY

## 2022-12-07 PROCEDURE — 99203 PR OFFICE/OUTPT VISIT, NEW, LEVL III, 30-44 MIN: ICD-10-PCS | Mod: S$PBB,,, | Performed by: SURGERY

## 2022-12-07 PROCEDURE — 1159F MED LIST DOCD IN RCRD: CPT | Mod: CPTII,,, | Performed by: SURGERY

## 2022-12-07 PROCEDURE — 1160F PR REVIEW ALL MEDS BY PRESCRIBER/CLIN PHARMACIST DOCUMENTED: ICD-10-PCS | Mod: CPTII,,, | Performed by: SURGERY

## 2022-12-07 PROCEDURE — 1160F RVW MEDS BY RX/DR IN RCRD: CPT | Mod: CPTII,,, | Performed by: SURGERY

## 2022-12-07 PROCEDURE — 99203 OFFICE O/P NEW LOW 30 MIN: CPT | Mod: S$PBB,,, | Performed by: SURGERY

## 2022-12-07 PROCEDURE — 99213 OFFICE O/P EST LOW 20 MIN: CPT | Mod: PBBFAC | Performed by: SURGERY

## 2022-12-07 NOTE — PROGRESS NOTES
"Subjective:       Patient ID: Domi Singh is a 77 y.o. female.    Chief Complaint: Consult (C/O abd pain, hx of colitis)  This patient is referred that CT scan some mild mesenteric vascular disease.  She had apparently some ischemic colitis in the past 2001.  She admits abdominal pain years duration may or may not have little weight loss.  Occasionally food makes it worse describes it the epigastric area CT scan personally reviewed minimal disease of the mesenteric vessels she would a gallbladder ultrasound without stones he is never had a HIDA states she is had an EGD and a colonoscopy amylase and lipase were normal liver enzymes normal  family history includes Diabetes in her father, mother, and sister; Hypertension in her brother, father, mother, and sister.  Past Medical History:   Diagnosis Date    Allergy     GERD (gastroesophageal reflux disease)     Hypertension     Obesities, morbid       Past Surgical History:   Procedure Laterality Date    CATARACT EXTRACTION, BILATERAL         reports that she quit smoking about 16 years ago. Her smoking use included cigarettes. She has a 54.00 pack-year smoking history. She has never used smokeless tobacco. She reports that she does not drink alcohol and does not use drugs.   HPI  Review of Systems      Objective:      Ht 5' 7" (1.702 m)   Wt (!) 147.4 kg (325 lb)   BMI 50.90 kg/m²    Physical Exam  Constitutional:       Appearance: Normal appearance.   Cardiovascular:      Rate and Rhythm: Normal rate and regular rhythm.   Pulmonary:      Effort: Pulmonary effort is normal.      Breath sounds: Normal breath sounds.   Abdominal:      General: Abdomen is flat. There is no distension.      Palpations: Abdomen is soft. There is no mass.      Tenderness: There is no abdominal tenderness.      Hernia: No hernia is present.   Skin:     General: Skin is warm and dry.   Neurological:      Mental Status: She is alert.         Assessment:       1. Class 3 severe obesity due " to excess calories with serious comorbidity and body mass index (BMI) of 50.0 to 59.9 in adult    2. Chronic abdominal pain    3. Upper abdominal pain, unspecified        Plan:       Chronic abdominal pain we will evaluate biliary tree mesentery vasculature does not have any significant or critical disease

## 2022-12-07 NOTE — PATIENT INSTRUCTIONS
Nothing to eat or drink past midnight    [facility] December 19th at 9:00    Your follow up with Dr. Moreira will be on 21st at 9:00

## 2022-12-19 ENCOUNTER — HOSPITAL ENCOUNTER (OUTPATIENT)
Dept: RADIOLOGY | Facility: HOSPITAL | Age: 77
Discharge: HOME | End: 2022-12-19
Attending: SURGERY
Payer: COMMERCIAL

## 2022-12-19 DIAGNOSIS — R10.10 UPPER ABDOMINAL PAIN, UNSPECIFIED: ICD-10-CM

## 2022-12-19 PROCEDURE — A9537 TC99M MEBROFENIN: HCPCS

## 2022-12-19 PROCEDURE — 78227 HEPATOBIL SYST IMAGE W/DRUG: CPT | Mod: 26,,, | Performed by: RADIOLOGY

## 2022-12-19 PROCEDURE — 78227 NM HEPATOBILIARY(HIDA) WITH PHARM AND EF: ICD-10-PCS | Mod: 26,,, | Performed by: RADIOLOGY

## 2022-12-21 ENCOUNTER — OFFICE VISIT (OUTPATIENT)
Dept: SURGERY | Facility: CLINIC | Age: 77
End: 2022-12-21
Payer: COMMERCIAL

## 2022-12-21 VITALS
DIASTOLIC BLOOD PRESSURE: 62 MMHG | SYSTOLIC BLOOD PRESSURE: 114 MMHG | HEIGHT: 67 IN | BODY MASS INDEX: 45.99 KG/M2 | HEART RATE: 79 BPM | RESPIRATION RATE: 18 BRPM | WEIGHT: 293 LBS

## 2022-12-21 DIAGNOSIS — G89.29 CHRONIC ABDOMINAL PAIN: Primary | ICD-10-CM

## 2022-12-21 DIAGNOSIS — R10.9 CHRONIC ABDOMINAL PAIN: Primary | ICD-10-CM

## 2022-12-21 PROCEDURE — 99212 PR OFFICE/OUTPT VISIT, EST, LEVL II, 10-19 MIN: ICD-10-PCS | Mod: S$PBB,,, | Performed by: SURGERY

## 2022-12-21 PROCEDURE — 99212 OFFICE O/P EST SF 10 MIN: CPT | Mod: S$PBB,,, | Performed by: SURGERY

## 2022-12-21 PROCEDURE — 3074F SYST BP LT 130 MM HG: CPT | Mod: CPTII,,, | Performed by: SURGERY

## 2022-12-21 PROCEDURE — 99214 OFFICE O/P EST MOD 30 MIN: CPT | Mod: PBBFAC | Performed by: SURGERY

## 2022-12-21 PROCEDURE — 3078F DIAST BP <80 MM HG: CPT | Mod: CPTII,,, | Performed by: SURGERY

## 2022-12-21 PROCEDURE — 1159F MED LIST DOCD IN RCRD: CPT | Mod: CPTII,,, | Performed by: SURGERY

## 2022-12-21 PROCEDURE — 1159F PR MEDICATION LIST DOCUMENTED IN MEDICAL RECORD: ICD-10-PCS | Mod: CPTII,,, | Performed by: SURGERY

## 2022-12-21 PROCEDURE — 3074F PR MOST RECENT SYSTOLIC BLOOD PRESSURE < 130 MM HG: ICD-10-PCS | Mod: CPTII,,, | Performed by: SURGERY

## 2022-12-21 PROCEDURE — 3078F PR MOST RECENT DIASTOLIC BLOOD PRESSURE < 80 MM HG: ICD-10-PCS | Mod: CPTII,,, | Performed by: SURGERY

## 2022-12-21 NOTE — PROGRESS NOTES
General surgery clinic    Follow-up HIDA scan with intervention     Ejection fraction 12%     Educated patient     She refusing cholecystectomy     Educated regarding dietary changes     Call for problems absolutely refusing any kind of surgery or intervention

## 2022-12-22 RX ORDER — PREDNISONE 20 MG/1
40 TABLET ORAL DAILY
Qty: 10 TABLET | Refills: 0 | Status: SHIPPED | OUTPATIENT
Start: 2022-12-22 | End: 2023-04-25 | Stop reason: ALTCHOICE

## 2022-12-22 RX ORDER — AZITHROMYCIN 250 MG/1
TABLET, FILM COATED ORAL
Qty: 6 TABLET | Refills: 1 | Status: SHIPPED | OUTPATIENT
Start: 2022-12-22 | End: 2023-02-28

## 2023-01-03 RX ORDER — CETIRIZINE HYDROCHLORIDE, PSEUDOEPHEDRINE HYDROCHLORIDE 5; 120 MG/1; MG/1
1 TABLET, FILM COATED, EXTENDED RELEASE ORAL 2 TIMES DAILY PRN
Qty: 24 TABLET | Refills: 5 | Status: SHIPPED | OUTPATIENT
Start: 2023-01-03 | End: 2023-01-13

## 2023-02-07 NOTE — PROGRESS NOTES
[facility]     PATIENT NAME: Domi Singh   : 1945    AGE: 77 y.o. DATE: 2023   MRN: [id number]        Reason for Visit / Chief Complaint: Medicare AWV (Subsequent [employer] AWV )        Domi Singh presents for a Subsequent [employer] AWV today.     The following components were reviewed and updated:    Medical/Social/Family History:  Past Medical History:   Diagnosis Date    Allergy     GERD (gastroesophageal reflux disease)     Hypertension     Obesities, morbid         Family History   Problem Relation Age of Onset    Diabetes Mother     Hypertension Mother     Diabetes Father     Hypertension Father     Diabetes Sister     Hypertension Sister     Hypertension Brother         Past Surgical History:   Procedure Laterality Date    CATARACT EXTRACTION, BILATERAL         Social History     Tobacco Use   Smoking Status Former    Packs/day: 1.50    Years: 36.00    Pack years: 54.00    Types: Cigarettes    Quit date:     Years since quittin.1   Smokeless Tobacco Never       Social History     Substance and Sexual Activity   Alcohol Use Never         Allergies and Current Medications   Review of patient's allergies indicates:  No Known Allergies    Current Outpatient Medications:     albuterol (PROVENTIL/VENTOLIN HFA) 90 mcg/actuation inhaler, Inhale 2 puffs into the lungs every 6 (six) hours as needed. Rescue, Disp: , Rfl:     albuterol-ipratropium (DUO-NEB) 2.5 mg-0.5 mg/3 mL nebulizer solution, Take 3 mLs by nebulization every 6 (six) hours as needed. Rescue, Disp: , Rfl:     amLODIPine (NORVASC) 5 MG tablet, Take 1 tablet by mouth daily., Disp: 90 tablet, Rfl: 0    budesonide-formoterol 160-4.5 mcg (SYMBICORT) 160-4.5 mcg/actuation HFAA, Inhale 2 puffs into the lungs 2 (two) times a day. Controller, Disp: , Rfl:     ergocalciferol (ERGOCALCIFEROL) 50,000 unit Cap, Take 1 capsule (50,000 Units total) by mouth every 7 days., Disp: 12 capsule, Rfl: 0    ergocalciferol  (ERGOCALCIFEROL) 50,000 unit Cap, Take 1 capsule by mouth weekly., Disp: 12 capsule, Rfl: 0    FEROSUL 325 mg (65 mg iron) Tab tablet, Take 1 tablet by mouth three times daily., Disp: 270 tablet, Rfl: 0    hydroCHLOROthiazide (HYDRODIURIL) 25 MG tablet, Take 1 tablet by mouth once daily., Disp: , Rfl:     hydrOXYzine pamoate (VISTARIL) 25 MG Cap, Take 1 capsule (25 mg total) by mouth every 8 (eight) hours as needed (nerves)., Disp: 30 capsule, Rfl: 11    losartan-hydrochlorothiazide 100-12.5 mg (HYZAAR) 100-12.5 mg Tab, Take 1 tablet by mouth daily., Disp: 90 tablet, Rfl: 3    montelukast (SINGULAIR) 10 mg tablet, Take 1 tablet by mouth once daily., Disp: , Rfl:     omeprazole (PRILOSEC) 20 MG capsule, Take 1 capsule (20 mg total) by mouth once daily., Disp: 90 capsule, Rfl: 11    pravastatin (PRAVACHOL) 20 MG tablet, Take 1 tablet (20 mg total) by mouth once daily., Disp: 90 tablet, Rfl: 3    predniSONE (DELTASONE) 20 MG tablet, Take 2 tablets (40 mg total) by mouth once daily., Disp: 10 tablet, Rfl: 0    theophylline 400 mg Tb24, Take 1 tablet by mouth once daily., Disp: , Rfl:       Health Risk Assessment   Fall Risk:  no   Obesity: BMI Body mass index is 50.9 kg/m².   Advance Directive:  Does not have an advanced directive.  Verbal information given and written information provided on today's AVS.   Depression: PHQ9- 4   HTN: DASH diet, exercise, weight management, med compliance, home BP monitoring, and follow-up discussed.   T2DM:  diabetic diet, glucose monitoring, activity level, weight management, med compliance, and follow-up discussed.  STI: not at risk   Statin Use: yes      Health Maintenance   Last eye exam: 2 years ago, does not remember doctor's name.  Awaiting appointment with Dr. Madden   Last CV screen with lipids: 11/07/2022   Diabetes screening with fasting glucose or A1c: 11/07/2022   Last pap/pelvic: 03/27/2019  advanced age   Last Mammogram: 03/25/2019  advanced age  DEXA: has not had,  declines              Colonoscopy: 01/23/2021  no follow up indicated on report   Flu Vaccine: 10/25/2022   Pneumonia vaccines: 13- 01/13/2016  23- 11/03/2008   COVID vaccine: 02/04/2021 03/08/2021 04/30/2022   Hep B vaccine: NA  AAA screening: NA   HIV Screening: not high risk  Hepatitis C Screen: available, declines  Low Dose CT Scan: 07/15/2019    Health Maintenance Topics with due status: Not Due       Topic Last Completion Date    Lipid Panel 11/07/2022     Health Maintenance Due   Topic Date Due    TETANUS VACCINE  Never done    Shingles Vaccine (1 of 2) Never done    COVID-19 Vaccine (4 - Booster for Moderna series) 06/25/2022         Lab results available in Epic or see dates from [facility] above:   Lab Results   Component Value Date    CHOL 203 (H) 11/07/2022    CHOL 173 04/25/2022    CHOL 186 07/27/2021     Lab Results   Component Value Date    HDL 47 11/07/2022    HDL 45 04/25/2022    HDL 45 07/27/2021     Lab Results   Component Value Date    LDLCALC 130 11/07/2022    LDLCALC 101 04/25/2022    LDLCALC 117 07/27/2021     Lab Results   Component Value Date    TRIG 130 11/07/2022    TRIG 137 04/25/2022    TRIG 120 07/27/2021     Lab Results   Component Value Date    CHOLHDL 4.3 11/07/2022    CHOLHDL 3.8 04/25/2022    CHOLHDL 4.1 07/27/2021       Lab Results   Component Value Date    HGBA1C 5.3 11/07/2022       Sodium   Date Value Ref Range Status   11/07/2022 136 136 - 145 mmol/L Final     Potassium   Date Value Ref Range Status   11/07/2022 4.5 3.5 - 5.1 mmol/L Final     Chloride   Date Value Ref Range Status   11/07/2022 103 98 - 107 mmol/L Final     CO2   Date Value Ref Range Status   11/07/2022 28 21 - 32 mmol/L Final     Glucose   Date Value Ref Range Status   11/07/2022 111 (H) 74 - 106 mg/dL Final     BUN   Date Value Ref Range Status   11/07/2022 28 (H) 7 - 18 mg/dL Final     Creatinine   Date Value Ref Range Status   11/07/2022 1.29 (H) 0.55 - 1.02 mg/dL Final     Calcium   Date Value Ref Range Status  "  11/07/2022 9.3 8.5 - 10.1 mg/dL Final     Total Protein   Date Value Ref Range Status   11/07/2022 7.1 6.4 - 8.2 g/dL Final     Albumin   Date Value Ref Range Status   11/07/2022 3.2 (L) 3.5 - 5.0 g/dL Final     Bilirubin, Total   Date Value Ref Range Status   11/07/2022 0.3 >0.0 - 1.2 mg/dL Final     Alk Phos   Date Value Ref Range Status   11/07/2022 115 55 - 142 U/L Final     AST   Date Value Ref Range Status   11/07/2022 8 (L) 15 - 37 U/L Final     ALT   Date Value Ref Range Status   11/07/2022 15 13 - 56 U/L Final     Anion Gap   Date Value Ref Range Status   11/07/2022 10 7 - 16 mmol/L Final     eGFR    Date Value Ref Range Status   07/27/2021 43 (L) >=60 mL/min/1.73m² Final     eGFR   Date Value Ref Range Status   04/25/2022 50 (L) >=60 mL/min/1.73m² Final       Incontinence  Bowel: no  Bladder: no      Care Team:  Dr. Zack VARGHESE   PCP          **See Completed Assessments for Annual Wellness visit within the encounter summary    The following assessments were completed & reviewed:  Depression Screening  Cognitive function Screening  Timed Get Up Test  Whisper Test  Vision Screen  Health Risk Assessment  Checklist of ADLs and IADLs  Opioid Risk Assessment        Objective  Vitals:    02/20/23 0844   BP: (!) 112/58   Pulse: 88   Resp: 14   Temp: 98.3 °F (36.8 °C)   TempSrc: Oral   SpO2: (!) 94%   Weight: (!) 147.4 kg (325 lb)   Height: 5' 7" (1.702 m)   PainSc: 0-No pain      Body mass index is 50.9 kg/m².  Ideal body weight: 61.6 kg (135 lb 12.9 oz)       Physical Exam      Assessment:     1. Encounter for subsequent annual wellness visit (AWV) in Medicare patient    2. Hypertension, unspecified type    3. BMI 50.0-59.9, adult    4. Morbid obesity due to excess calories    5. Use of cane as ambulatory aid       Problem List Items Addressed This Visit          Cardiac/Vascular    Hypertension     Other Visit Diagnoses       Encounter for subsequent annual wellness visit (AWV) in Medicare " patient    -  Primary    BMI 50.0-59.9, adult        Morbid obesity due to excess calories        Use of cane as ambulatory aid                  Plan:    Referrals:   none     Advised to call office if does not hear from anyone with referral appt within 2-3 weeks to check on status of referral. Voiced understanding.      Discussed and provided with a screening schedule and personal prevention plan in accordance with USPSTF age appropriate recommendations and Medicare screening guidelines.   Education, counseling, and referrals were provided as needed.  After Visit Summary printed and given to patient which includes written education and a list of any referrals if indicated.     Education including diet, exercise, falls and advanced directives discussed with patient and patient verbalized understanding.      F/u plan for yearly AWV.    Signature: NATHAN Pryor

## 2023-02-20 ENCOUNTER — OFFICE VISIT (OUTPATIENT)
Dept: PRIMARY CARE CLINIC | Facility: CLINIC | Age: 78
End: 2023-02-20
Payer: COMMERCIAL

## 2023-02-20 VITALS
HEIGHT: 67 IN | OXYGEN SATURATION: 94 % | DIASTOLIC BLOOD PRESSURE: 58 MMHG | BODY MASS INDEX: 45.99 KG/M2 | HEART RATE: 88 BPM | TEMPERATURE: 98 F | WEIGHT: 293 LBS | RESPIRATION RATE: 14 BRPM | SYSTOLIC BLOOD PRESSURE: 112 MMHG

## 2023-02-20 DIAGNOSIS — Z99.89 USE OF CANE AS AMBULATORY AID: ICD-10-CM

## 2023-02-20 DIAGNOSIS — I10 HYPERTENSION, UNSPECIFIED TYPE: ICD-10-CM

## 2023-02-20 DIAGNOSIS — Z00.00 ENCOUNTER FOR SUBSEQUENT ANNUAL WELLNESS VISIT (AWV) IN MEDICARE PATIENT: Primary | ICD-10-CM

## 2023-02-20 DIAGNOSIS — E66.01 MORBID OBESITY DUE TO EXCESS CALORIES: ICD-10-CM

## 2023-02-20 PROCEDURE — 1126F AMNT PAIN NOTED NONE PRSNT: CPT | Mod: ,,, | Performed by: NURSE PRACTITIONER

## 2023-02-20 PROCEDURE — G0439 PR MEDICARE ANNUAL WELLNESS SUBSEQUENT VISIT: ICD-10-PCS | Mod: ,,, | Performed by: NURSE PRACTITIONER

## 2023-02-20 PROCEDURE — 1160F RVW MEDS BY RX/DR IN RCRD: CPT | Mod: ,,, | Performed by: NURSE PRACTITIONER

## 2023-02-20 PROCEDURE — 3074F SYST BP LT 130 MM HG: CPT | Mod: ,,, | Performed by: NURSE PRACTITIONER

## 2023-02-20 PROCEDURE — 3078F PR MOST RECENT DIASTOLIC BLOOD PRESSURE < 80 MM HG: ICD-10-PCS | Mod: ,,, | Performed by: NURSE PRACTITIONER

## 2023-02-20 PROCEDURE — 1126F PR PAIN SEVERITY QUANTIFIED, NO PAIN PRESENT: ICD-10-PCS | Mod: ,,, | Performed by: NURSE PRACTITIONER

## 2023-02-20 PROCEDURE — 1160F PR REVIEW ALL MEDS BY PRESCRIBER/CLIN PHARMACIST DOCUMENTED: ICD-10-PCS | Mod: ,,, | Performed by: NURSE PRACTITIONER

## 2023-02-20 PROCEDURE — 1159F MED LIST DOCD IN RCRD: CPT | Mod: ,,, | Performed by: NURSE PRACTITIONER

## 2023-02-20 PROCEDURE — G0439 PPPS, SUBSEQ VISIT: HCPCS | Mod: ,,, | Performed by: NURSE PRACTITIONER

## 2023-02-20 PROCEDURE — 3078F DIAST BP <80 MM HG: CPT | Mod: ,,, | Performed by: NURSE PRACTITIONER

## 2023-02-20 PROCEDURE — 1101F PT FALLS ASSESS-DOCD LE1/YR: CPT | Mod: ,,, | Performed by: NURSE PRACTITIONER

## 2023-02-20 PROCEDURE — 3288F FALL RISK ASSESSMENT DOCD: CPT | Mod: ,,, | Performed by: NURSE PRACTITIONER

## 2023-02-20 PROCEDURE — 3074F PR MOST RECENT SYSTOLIC BLOOD PRESSURE < 130 MM HG: ICD-10-PCS | Mod: ,,, | Performed by: NURSE PRACTITIONER

## 2023-02-20 PROCEDURE — 3288F PR FALLS RISK ASSESSMENT DOCUMENTED: ICD-10-PCS | Mod: ,,, | Performed by: NURSE PRACTITIONER

## 2023-02-20 PROCEDURE — 1159F PR MEDICATION LIST DOCUMENTED IN MEDICAL RECORD: ICD-10-PCS | Mod: ,,, | Performed by: NURSE PRACTITIONER

## 2023-02-20 PROCEDURE — 1101F PR PT FALLS ASSESS DOC 0-1 FALLS W/OUT INJ PAST YR: ICD-10-PCS | Mod: ,,, | Performed by: NURSE PRACTITIONER

## 2023-02-20 NOTE — PATIENT INSTRUCTIONS
Counseling and Referral of Other Preventative  (Italic type indicates deductible and co-insurance are waived)    Patient Name: Domi Singh  Today's Date: 2/20/2023    Health Maintenance         Date Due Completion Date    Hepatitis C Screening Never done ---    TETANUS VACCINE Never done ---    DEXA Scan Never done ---    Shingles Vaccine (1 of 2) Never done ---    Pneumococcal Vaccines (Age 65+) (3) 01/13/2017 1/13/2016    COVID-19 Vaccine (4 - Booster for Moderna series) 06/25/2022 4/30/2022    Lipid Panel 11/07/2027 11/7/2022          No orders of the defined types were placed in this encounter.

## 2023-02-28 RX ORDER — AZITHROMYCIN 250 MG/1
TABLET, FILM COATED ORAL
Qty: 6 TABLET | Refills: 0 | Status: SHIPPED | OUTPATIENT
Start: 2023-02-28 | End: 2023-04-25 | Stop reason: ALTCHOICE

## 2023-03-13 RX ORDER — OMEPRAZOLE 20 MG/1
CAPSULE, DELAYED RELEASE ORAL
Qty: 90 CAPSULE | Refills: 10 | Status: SHIPPED | OUTPATIENT
Start: 2023-03-13 | End: 2024-03-04

## 2023-04-17 RX ORDER — AMLODIPINE BESYLATE 5 MG/1
TABLET ORAL
Qty: 90 TABLET | Refills: 0 | Status: SHIPPED | OUTPATIENT
Start: 2023-04-17 | End: 2023-07-17

## 2023-04-17 RX ORDER — FERROUS SULFATE TAB 325 MG (65 MG ELEMENTAL FE) 325 (65 FE) MG
TAB ORAL
Qty: 270 TABLET | Refills: 0 | Status: SHIPPED | OUTPATIENT
Start: 2023-04-17 | End: 2023-07-17

## 2023-04-17 RX ORDER — ERGOCALCIFEROL 1.25 MG/1
CAPSULE ORAL
Qty: 12 CAPSULE | Refills: 0 | Status: SHIPPED | OUTPATIENT
Start: 2023-04-17 | End: 2023-07-17

## 2023-04-25 ENCOUNTER — OFFICE VISIT (OUTPATIENT)
Dept: PRIMARY CARE CLINIC | Facility: CLINIC | Age: 78
End: 2023-04-25
Payer: COMMERCIAL

## 2023-04-25 ENCOUNTER — TELEPHONE (OUTPATIENT)
Dept: PRIMARY CARE CLINIC | Facility: CLINIC | Age: 78
End: 2023-04-25
Payer: COMMERCIAL

## 2023-04-25 VITALS
HEIGHT: 67 IN | RESPIRATION RATE: 18 BRPM | BODY MASS INDEX: 45.99 KG/M2 | OXYGEN SATURATION: 90 % | DIASTOLIC BLOOD PRESSURE: 60 MMHG | HEART RATE: 89 BPM | SYSTOLIC BLOOD PRESSURE: 130 MMHG | WEIGHT: 293 LBS

## 2023-04-25 DIAGNOSIS — J02.9 PHARYNGITIS, UNSPECIFIED ETIOLOGY: ICD-10-CM

## 2023-04-25 DIAGNOSIS — K21.9 GASTROESOPHAGEAL REFLUX DISEASE, UNSPECIFIED WHETHER ESOPHAGITIS PRESENT: ICD-10-CM

## 2023-04-25 DIAGNOSIS — E11.9 TYPE 2 DIABETES MELLITUS WITHOUT COMPLICATION, WITHOUT LONG-TERM CURRENT USE OF INSULIN: Primary | ICD-10-CM

## 2023-04-25 DIAGNOSIS — J30.9 ALLERGIC RHINITIS, UNSPECIFIED SEASONALITY, UNSPECIFIED TRIGGER: ICD-10-CM

## 2023-04-25 DIAGNOSIS — I10 HYPERTENSION, UNSPECIFIED TYPE: ICD-10-CM

## 2023-04-25 PROCEDURE — 99214 OFFICE O/P EST MOD 30 MIN: CPT | Mod: ,,, | Performed by: FAMILY MEDICINE

## 2023-04-25 PROCEDURE — 3078F DIAST BP <80 MM HG: CPT | Mod: ,,, | Performed by: FAMILY MEDICINE

## 2023-04-25 PROCEDURE — 1159F PR MEDICATION LIST DOCUMENTED IN MEDICAL RECORD: ICD-10-PCS | Mod: ,,, | Performed by: FAMILY MEDICINE

## 2023-04-25 PROCEDURE — 99214 PR OFFICE/OUTPT VISIT, EST, LEVL IV, 30-39 MIN: ICD-10-PCS | Mod: ,,, | Performed by: FAMILY MEDICINE

## 2023-04-25 PROCEDURE — 1159F MED LIST DOCD IN RCRD: CPT | Mod: ,,, | Performed by: FAMILY MEDICINE

## 2023-04-25 PROCEDURE — 1101F PT FALLS ASSESS-DOCD LE1/YR: CPT | Mod: ,,, | Performed by: FAMILY MEDICINE

## 2023-04-25 PROCEDURE — 3288F PR FALLS RISK ASSESSMENT DOCUMENTED: ICD-10-PCS | Mod: ,,, | Performed by: FAMILY MEDICINE

## 2023-04-25 PROCEDURE — 3288F FALL RISK ASSESSMENT DOCD: CPT | Mod: ,,, | Performed by: FAMILY MEDICINE

## 2023-04-25 PROCEDURE — 3078F PR MOST RECENT DIASTOLIC BLOOD PRESSURE < 80 MM HG: ICD-10-PCS | Mod: ,,, | Performed by: FAMILY MEDICINE

## 2023-04-25 PROCEDURE — 3075F PR MOST RECENT SYSTOLIC BLOOD PRESS GE 130-139MM HG: ICD-10-PCS | Mod: ,,, | Performed by: FAMILY MEDICINE

## 2023-04-25 PROCEDURE — 1101F PR PT FALLS ASSESS DOC 0-1 FALLS W/OUT INJ PAST YR: ICD-10-PCS | Mod: ,,, | Performed by: FAMILY MEDICINE

## 2023-04-25 PROCEDURE — 3075F SYST BP GE 130 - 139MM HG: CPT | Mod: ,,, | Performed by: FAMILY MEDICINE

## 2023-04-25 RX ORDER — CETIRIZINE HYDROCHLORIDE AND PSEUDOEPHEDRINE HYDROCHLORIDE 5; 120 MG/1; MG/1
1 TABLET, FILM COATED, EXTENDED RELEASE ORAL 2 TIMES DAILY
COMMUNITY
Start: 2023-02-28

## 2023-04-25 NOTE — TELEPHONE ENCOUNTER
----- Message from Guido Dixon III, DO sent at 4/25/2023  3:23 PM CDT -----  Labs good let know thx

## 2023-04-28 NOTE — PROGRESS NOTES
Subjective:      Patient ID: Domi Singh is a 78 y.o. female.    Chief Complaint: Follow-up (6mon. Ck-up)    Domi Singh a 78 y.o. female presents for follow up on all regular problems which are reviewed and discussed.     Problem List Items Addressed This Visit          ENT    Allergic rhinitis    Pharyngitis       Cardiac/Vascular    Hypertension       GI    Gastroesophageal reflux disease     Other Visit Diagnoses       Type 2 diabetes mellitus without complication, without long-term current use of insulin    -  Primary    Relevant Orders    CBC Auto Differential (Completed)    Comprehensive Metabolic Panel (Completed)    Lipid Panel (Completed)    TSH (Completed)    Urinalysis (Completed)    Hemoglobin A1C (Completed)            Past Medical History:  Past Medical History:   Diagnosis Date    Allergy     GERD (gastroesophageal reflux disease)     Hypertension     Obesities, morbid      Past Surgical History:   Procedure Laterality Date    CATARACT EXTRACTION, BILATERAL       Review of patient's allergies indicates:  No Known Allergies  Current Outpatient Medications on File Prior to Visit   Medication Sig Dispense Refill    albuterol (PROVENTIL/VENTOLIN HFA) 90 mcg/actuation inhaler Inhale 2 puffs into the lungs every 6 (six) hours as needed. Rescue      albuterol-ipratropium (DUO-NEB) 2.5 mg-0.5 mg/3 mL nebulizer solution Take 3 mLs by nebulization every 6 (six) hours as needed. Rescue      amLODIPine (NORVASC) 5 MG tablet Take 1 tablet by mouth daily. 90 tablet 0    budesonide-formoterol 160-4.5 mcg (SYMBICORT) 160-4.5 mcg/actuation HFAA Inhale 2 puffs into the lungs 2 (two) times a day. Controller      ergocalciferol (ERGOCALCIFEROL) 50,000 unit Cap Take 1 capsule by mouth weekly. 12 capsule 0    FEROSUL 325 mg (65 mg iron) Tab tablet Take 1 tablet by mouth three times daily. 270 tablet 0    hydroCHLOROthiazide (HYDRODIURIL) 25 MG tablet Take 1 tablet by mouth once daily.      hydrOXYzine pamoate  (VISTARIL) 25 MG Cap Take 1 capsule (25 mg total) by mouth every 8 (eight) hours as needed (nerves). 30 capsule 11    losartan-hydrochlorothiazide 100-12.5 mg (HYZAAR) 100-12.5 mg Tab Take 1 tablet by mouth daily. 90 tablet 3    montelukast (SINGULAIR) 10 mg tablet Take 1 tablet by mouth once daily.      omeprazole (PRILOSEC) 20 MG capsule Take 1 capsule by mouth once daily. 90 capsule 10    pravastatin (PRAVACHOL) 20 MG tablet Take 1 tablet (20 mg total) by mouth once daily. 90 tablet 3    theophylline 400 mg Tb24 Take 1 tablet by mouth once daily.      ZYRTEC-D 5-120 mg Tb12 Take 1 tablet by mouth 2 (two) times a day.       No current facility-administered medications on file prior to visit.     Social History     Socioeconomic History    Marital status:    Tobacco Use    Smoking status: Former     Packs/day: 1.50     Years: 36.00     Pack years: 54.00     Types: Cigarettes     Quit date:      Years since quittin.3    Smokeless tobacco: Never   Substance and Sexual Activity    Alcohol use: Never    Drug use: Never    Sexual activity: Not Currently     Family History   Problem Relation Age of Onset    Diabetes Mother     Hypertension Mother     Diabetes Father     Hypertension Father     Diabetes Sister     Hypertension Sister     Hypertension Brother        Review of Systems   Constitutional: Negative.  Negative for activity change, appetite change, chills and diaphoresis.   HENT: Negative.  Negative for congestion, ear pain, hearing loss and postnasal drip.    Eyes:  Negative for itching.   Respiratory:  Negative for chest tightness and shortness of breath.    Cardiovascular:  Negative for chest pain.   Gastrointestinal:  Negative for abdominal pain.   Endocrine: Negative for polydipsia.   Genitourinary:  Negative for frequency.   Musculoskeletal:  Negative for back pain.   Neurological:  Negative for headaches.     Objective:     /60 (BP Location: Left arm, Patient Position: Sitting, BP  "Method: Large (Manual))   Pulse 89   Resp 18   Ht 5' 7" (1.702 m)   Wt (!) 148.8 kg (328 lb)   SpO2 (!) 90%   BMI 51.37 kg/m²     Physical Exam  Constitutional:       Appearance: Normal appearance. She is obese.   HENT:      Head: Normocephalic and atraumatic.      Right Ear: External ear normal.      Left Ear: External ear normal.      Nose: Nose normal.      Mouth/Throat:      Mouth: Mucous membranes are moist.      Pharynx: Oropharynx is clear.   Eyes:      Pupils: Pupils are equal, round, and reactive to light.   Neck:      Vascular: No carotid bruit.   Cardiovascular:      Rate and Rhythm: Normal rate and regular rhythm.      Heart sounds: Normal heart sounds. No murmur heard.    No gallop.   Pulmonary:      Effort: Pulmonary effort is normal. No respiratory distress.      Breath sounds: Normal breath sounds. No wheezing or rales.   Abdominal:      Palpations: Abdomen is soft.   Musculoskeletal:      Cervical back: Normal range of motion and neck supple.   Skin:     General: Skin is warm and dry.   Neurological:      General: No focal deficit present.      Mental Status: She is alert and oriented to person, place, and time. Mental status is at baseline.   Psychiatric:         Mood and Affect: Mood normal.         Behavior: Behavior normal.         Thought Content: Thought content normal.         Judgment: Judgment normal.       1. Type 2 diabetes mellitus without complication, without long-term current use of insulin    2. Hypertension, unspecified type    3. Gastroesophageal reflux disease, unspecified whether esophagitis present    4. Allergic rhinitis, unspecified seasonality, unspecified trigger    5. Pharyngitis, unspecified etiology        Plan:     Problem List Items Addressed This Visit          ENT    Allergic rhinitis    Pharyngitis       Cardiac/Vascular    Hypertension       GI    Gastroesophageal reflux disease     Other Visit Diagnoses       Type 2 diabetes mellitus without complication, " without long-term current use of insulin    -  Primary    Relevant Orders    CBC Auto Differential (Completed)    Comprehensive Metabolic Panel (Completed)    Lipid Panel (Completed)    TSH (Completed)    Urinalysis (Completed)    Hemoglobin A1C (Completed)          No follow-ups on file.  6m fu    I have discontinued Domi Singh's predniSONE and azithromycin. I am also having her maintain her albuterol, hydroCHLOROthiazide, albuterol-ipratropium, montelukast, budesonide-formoterol 160-4.5 mcg, theophylline, hydrOXYzine pamoate, pravastatin, losartan-hydrochlorothiazide 100-12.5 mg, omeprazole, ergocalciferol, FeroSuL, amLODIPine, and ZyrTEC-D.    Domi was seen today for follow-up.    Diagnoses and all orders for this visit:    Type 2 diabetes mellitus without complication, without long-term current use of insulin  -     CBC Auto Differential; Future  -     Comprehensive Metabolic Panel; Future  -     Lipid Panel; Future  -     TSH; Future  -     Urinalysis; Future  -     Hemoglobin A1C; Future    Hypertension, unspecified type    Gastroesophageal reflux disease, unspecified whether esophagitis present    Allergic rhinitis, unspecified seasonality, unspecified trigger    Pharyngitis, unspecified etiology         [unfilled]  Orders Placed This Encounter   Procedures    CBC Auto Differential     Standing Status:   Future     Number of Occurrences:   1     Standing Expiration Date:   6/23/2024    Comprehensive Metabolic Panel     Standing Status:   Future     Number of Occurrences:   1     Standing Expiration Date:   6/23/2024    Lipid Panel     Standing Status:   Future     Number of Occurrences:   1     Standing Expiration Date:   6/23/2024    TSH     Standing Status:   Future     Number of Occurrences:   1     Standing Expiration Date:   6/23/2024    Urinalysis     Standing Status:   Future     Number of Occurrences:   1     Standing Expiration Date:   6/23/2024     Order Specific Question:   Collection Type      Answer:   Urine, Clean Catch    Hemoglobin A1C     Standing Status:   Future     Number of Occurrences:   1     Standing Expiration Date:   6/23/2024

## 2023-05-11 RX ORDER — NITROFURANTOIN 25; 75 MG/1; MG/1
100 CAPSULE ORAL 2 TIMES DAILY
Qty: 14 CAPSULE | Refills: 1 | Status: SHIPPED | OUTPATIENT
Start: 2023-05-11

## 2023-06-09 DIAGNOSIS — Z71.89 COMPLEX CARE COORDINATION: ICD-10-CM

## 2023-06-12 RX ORDER — FUROSEMIDE 40 MG/1
40 TABLET ORAL 2 TIMES DAILY
Qty: 60 TABLET | Refills: 11 | Status: SHIPPED | OUTPATIENT
Start: 2023-06-12 | End: 2024-06-11

## 2023-07-17 RX ORDER — ERGOCALCIFEROL 1.25 MG/1
CAPSULE ORAL
Qty: 12 CAPSULE | Refills: 0 | Status: SHIPPED | OUTPATIENT
Start: 2023-07-17 | End: 2023-10-16

## 2023-07-17 RX ORDER — FERROUS SULFATE TAB 325 MG (65 MG ELEMENTAL FE) 325 (65 FE) MG
TAB ORAL
Qty: 270 TABLET | Refills: 0 | Status: SHIPPED | OUTPATIENT
Start: 2023-07-17 | End: 2023-10-16

## 2023-07-17 RX ORDER — AMLODIPINE BESYLATE 5 MG/1
TABLET ORAL
Qty: 90 TABLET | Refills: 0 | Status: SHIPPED | OUTPATIENT
Start: 2023-07-17 | End: 2023-10-16

## 2023-10-03 RX ORDER — LEVOFLOXACIN 750 MG/1
750 TABLET ORAL DAILY
Qty: 10 TABLET | Refills: 1 | Status: SHIPPED | OUTPATIENT
Start: 2023-10-03

## 2023-10-03 RX ORDER — PREDNISONE 20 MG/1
40 TABLET ORAL DAILY
Qty: 10 TABLET | Refills: 0 | Status: SHIPPED | OUTPATIENT
Start: 2023-10-03 | End: 2023-10-25

## 2023-10-16 RX ORDER — FERROUS SULFATE TAB 325 MG (65 MG ELEMENTAL FE) 325 (65 FE) MG
TAB ORAL
Qty: 270 TABLET | Refills: 0 | Status: SHIPPED | OUTPATIENT
Start: 2023-10-16 | End: 2024-03-04

## 2023-10-16 RX ORDER — ERGOCALCIFEROL 1.25 MG/1
CAPSULE ORAL
Qty: 12 CAPSULE | Refills: 0 | Status: SHIPPED | OUTPATIENT
Start: 2023-10-16 | End: 2024-03-04

## 2023-10-16 RX ORDER — AMLODIPINE BESYLATE 5 MG/1
TABLET ORAL
Qty: 90 TABLET | Refills: 0 | Status: SHIPPED | OUTPATIENT
Start: 2023-10-16 | End: 2024-03-04

## 2023-10-25 ENCOUNTER — OFFICE VISIT (OUTPATIENT)
Dept: PRIMARY CARE CLINIC | Facility: CLINIC | Age: 78
End: 2023-10-25
Payer: COMMERCIAL

## 2023-10-25 VITALS
BODY MASS INDEX: 45.99 KG/M2 | DIASTOLIC BLOOD PRESSURE: 60 MMHG | SYSTOLIC BLOOD PRESSURE: 122 MMHG | HEART RATE: 83 BPM | HEIGHT: 67 IN | RESPIRATION RATE: 18 BRPM | OXYGEN SATURATION: 92 % | WEIGHT: 293 LBS

## 2023-10-25 DIAGNOSIS — Z23 NEED FOR VACCINATION: ICD-10-CM

## 2023-10-25 DIAGNOSIS — J30.9 ALLERGIC RHINITIS, UNSPECIFIED SEASONALITY, UNSPECIFIED TRIGGER: Primary | ICD-10-CM

## 2023-10-25 DIAGNOSIS — E66.01 CLASS 3 SEVERE OBESITY DUE TO EXCESS CALORIES WITH SERIOUS COMORBIDITY AND BODY MASS INDEX (BMI) OF 50.0 TO 59.9 IN ADULT: ICD-10-CM

## 2023-10-25 DIAGNOSIS — K21.9 GASTROESOPHAGEAL REFLUX DISEASE, UNSPECIFIED WHETHER ESOPHAGITIS PRESENT: ICD-10-CM

## 2023-10-25 DIAGNOSIS — I10 HYPERTENSION, UNSPECIFIED TYPE: ICD-10-CM

## 2023-10-25 PROCEDURE — G0008 FLU VACCINE - QUADRIVALENT - ADJUVANTED: ICD-10-PCS | Mod: ,,, | Performed by: FAMILY MEDICINE

## 2023-10-25 PROCEDURE — 90694 VACC AIIV4 NO PRSRV 0.5ML IM: CPT | Mod: ,,, | Performed by: FAMILY MEDICINE

## 2023-10-25 PROCEDURE — 99214 PR OFFICE/OUTPT VISIT, EST, LEVL IV, 30-39 MIN: ICD-10-PCS | Mod: ,,, | Performed by: FAMILY MEDICINE

## 2023-10-25 PROCEDURE — 3078F PR MOST RECENT DIASTOLIC BLOOD PRESSURE < 80 MM HG: ICD-10-PCS | Mod: ,,, | Performed by: FAMILY MEDICINE

## 2023-10-25 PROCEDURE — 3288F PR FALLS RISK ASSESSMENT DOCUMENTED: ICD-10-PCS | Mod: ,,, | Performed by: FAMILY MEDICINE

## 2023-10-25 PROCEDURE — 1159F PR MEDICATION LIST DOCUMENTED IN MEDICAL RECORD: ICD-10-PCS | Mod: ,,, | Performed by: FAMILY MEDICINE

## 2023-10-25 PROCEDURE — 99214 OFFICE O/P EST MOD 30 MIN: CPT | Mod: ,,, | Performed by: FAMILY MEDICINE

## 2023-10-25 PROCEDURE — 1101F PT FALLS ASSESS-DOCD LE1/YR: CPT | Mod: ,,, | Performed by: FAMILY MEDICINE

## 2023-10-25 PROCEDURE — 1159F MED LIST DOCD IN RCRD: CPT | Mod: ,,, | Performed by: FAMILY MEDICINE

## 2023-10-25 PROCEDURE — 1101F PR PT FALLS ASSESS DOC 0-1 FALLS W/OUT INJ PAST YR: ICD-10-PCS | Mod: ,,, | Performed by: FAMILY MEDICINE

## 2023-10-25 PROCEDURE — G0008 ADMIN INFLUENZA VIRUS VAC: HCPCS | Mod: ,,, | Performed by: FAMILY MEDICINE

## 2023-10-25 PROCEDURE — 3074F PR MOST RECENT SYSTOLIC BLOOD PRESSURE < 130 MM HG: ICD-10-PCS | Mod: ,,, | Performed by: FAMILY MEDICINE

## 2023-10-25 PROCEDURE — 90694 FLU VACCINE - QUADRIVALENT - ADJUVANTED: ICD-10-PCS | Mod: ,,, | Performed by: FAMILY MEDICINE

## 2023-10-25 PROCEDURE — 3074F SYST BP LT 130 MM HG: CPT | Mod: ,,, | Performed by: FAMILY MEDICINE

## 2023-10-25 PROCEDURE — 3078F DIAST BP <80 MM HG: CPT | Mod: ,,, | Performed by: FAMILY MEDICINE

## 2023-10-25 PROCEDURE — 3288F FALL RISK ASSESSMENT DOCD: CPT | Mod: ,,, | Performed by: FAMILY MEDICINE

## 2023-10-25 NOTE — PROGRESS NOTES
Subjective:      Patient ID: Domi Singh is a 78 y.o. female.    Chief Complaint: Follow-up (6mon. Ck-up) and Diabetes    Domi Singh a 78 y.o. female presents for follow up on all regular problems which are reviewed and discussed.     Problem List Items Addressed This Visit          ENT    Allergic rhinitis - Primary       Cardiac/Vascular    Hypertension       ID    Need for vaccination    Relevant Orders    Influenza (FLUAD) - Quadrivalent (Adjuvanted) *Preferred* (65+) (PF) (Completed)       Endocrine    Obesity due to excess calories       GI    Gastroesophageal reflux disease       Past Medical History:  Past Medical History:   Diagnosis Date    Allergy     GERD (gastroesophageal reflux disease)     Hypertension     Obesities, morbid      Past Surgical History:   Procedure Laterality Date    CATARACT EXTRACTION, BILATERAL       Review of patient's allergies indicates:  No Known Allergies  Current Outpatient Medications on File Prior to Visit   Medication Sig Dispense Refill    albuterol (PROVENTIL/VENTOLIN HFA) 90 mcg/actuation inhaler Inhale 2 puffs into the lungs every 6 (six) hours as needed. Rescue      albuterol-ipratropium (DUO-NEB) 2.5 mg-0.5 mg/3 mL nebulizer solution Take 3 mLs by nebulization every 6 (six) hours as needed. Rescue      amLODIPine (NORVASC) 5 MG tablet Take 1 tablet by mouth daily. 90 tablet 0    budesonide-formoterol 160-4.5 mcg (SYMBICORT) 160-4.5 mcg/actuation HFAA Inhale 2 puffs into the lungs 2 (two) times a day. Controller      ergocalciferol (ERGOCALCIFEROL) 50,000 unit Cap Take 1 capsule by mouth weekly. 12 capsule 0    FEROSUL 325 mg (65 mg iron) Tab tablet Take 1 tablet by mouth three times daily. 270 tablet 0    furosemide (LASIX) 40 MG tablet Take 1 tablet (40 mg total) by mouth 2 (two) times daily. 60 tablet 11    hydroCHLOROthiazide (HYDRODIURIL) 25 MG tablet Take 1 tablet by mouth once daily.      hydrOXYzine pamoate (VISTARIL) 25 MG Cap Take 1 capsule (25 mg total)  by mouth every 8 (eight) hours as needed (nerves). 30 capsule 11    losartan-hydrochlorothiazide 100-12.5 mg (HYZAAR) 100-12.5 mg Tab Take 1 tablet by mouth daily. 90 tablet 3    montelukast (SINGULAIR) 10 mg tablet Take 1 tablet by mouth once daily.      omeprazole (PRILOSEC) 20 MG capsule Take 1 capsule by mouth once daily. 90 capsule 10    pravastatin (PRAVACHOL) 20 MG tablet Take 1 tablet (20 mg total) by mouth once daily. 90 tablet 3    theophylline 400 mg Tb24 Take 1 tablet by mouth once daily.      ZYRTEC-D 5-120 mg Tb12 Take 1 tablet by mouth 2 (two) times a day.      levoFLOXacin (LEVAQUIN) 750 MG tablet Take 1 tablet (750 mg total) by mouth once daily. (Patient not taking: Reported on 10/25/2023) 10 tablet 1    nitrofurantoin, macrocrystal-monohydrate, (MACROBID) 100 MG capsule Take 1 capsule (100 mg total) by mouth 2 (two) times daily. (Patient not taking: Reported on 10/25/2023) 14 capsule 1     No current facility-administered medications on file prior to visit.     Social History     Socioeconomic History    Marital status:    Tobacco Use    Smoking status: Former     Current packs/day: 0.00     Average packs/day: 1.5 packs/day for 36.0 years (54.0 ttl pk-yrs)     Types: Cigarettes     Start date:      Quit date: 2006     Years since quittin.8    Smokeless tobacco: Never   Substance and Sexual Activity    Alcohol use: Never    Drug use: Never    Sexual activity: Not Currently     Family History   Problem Relation Age of Onset    Diabetes Mother     Hypertension Mother     Diabetes Father     Hypertension Father     Diabetes Sister     Hypertension Sister     Hypertension Brother        Review of Systems   Constitutional: Negative.  Negative for activity change, appetite change, chills and diaphoresis.   HENT: Negative.  Negative for congestion, ear pain, hearing loss and postnasal drip.    Eyes:  Negative for itching.   Respiratory:  Negative for chest tightness and shortness of breath.  "   Cardiovascular:  Negative for chest pain.   Gastrointestinal:  Negative for abdominal pain.   Endocrine: Negative for polydipsia.   Genitourinary:  Negative for frequency.   Musculoskeletal:  Negative for back pain.   Neurological:  Negative for headaches.       Objective:     /60 (BP Location: Left arm, Patient Position: Sitting, BP Method: Large (Manual))   Pulse 83   Resp 18   Ht 5' 7" (1.702 m)   Wt (!) 150.1 kg (331 lb)   SpO2 (!) 92%   BMI 51.84 kg/m²     Physical Exam  Constitutional:       Appearance: Normal appearance. She is obese.   HENT:      Head: Normocephalic and atraumatic.      Right Ear: External ear normal.      Left Ear: External ear normal.      Nose: Nose normal.      Mouth/Throat:      Mouth: Mucous membranes are moist.      Pharynx: Oropharynx is clear.   Eyes:      Pupils: Pupils are equal, round, and reactive to light.   Neck:      Vascular: No carotid bruit.   Cardiovascular:      Rate and Rhythm: Normal rate and regular rhythm.      Heart sounds: Normal heart sounds. No murmur heard.     No gallop.   Pulmonary:      Effort: Pulmonary effort is normal. No respiratory distress.      Breath sounds: Normal breath sounds. No wheezing.   Abdominal:      Palpations: Abdomen is soft.   Musculoskeletal:      Cervical back: Normal range of motion and neck supple.   Skin:     General: Skin is warm and dry.   Neurological:      General: No focal deficit present.      Mental Status: She is alert and oriented to person, place, and time. Mental status is at baseline.   Psychiatric:         Mood and Affect: Mood normal.         Behavior: Behavior normal.         Thought Content: Thought content normal.         Judgment: Judgment normal.         1. Allergic rhinitis, unspecified seasonality, unspecified trigger    2. Need for vaccination    3. Hypertension, unspecified type    4. Class 3 severe obesity due to excess calories with serious comorbidity and body mass index (BMI) of 50.0 to 59.9 " in adult    5. Gastroesophageal reflux disease, unspecified whether esophagitis present        Plan:     Problem List Items Addressed This Visit          ENT    Allergic rhinitis - Primary       Cardiac/Vascular    Hypertension       ID    Need for vaccination    Relevant Orders    Influenza (FLUAD) - Quadrivalent (Adjuvanted) *Preferred* (65+) (PF) (Completed)       Endocrine    Obesity due to excess calories       GI    Gastroesophageal reflux disease     No follow-ups on file.    6m fu  I am having Domi Singh maintain her albuterol, hydroCHLOROthiazide, albuterol-ipratropium, montelukast, budesonide-formoterol 160-4.5 mcg, theophylline, hydrOXYzine pamoate, pravastatin, losartan-hydrochlorothiazide 100-12.5 mg, omeprazole, ZyrTEC-D, nitrofurantoin (macrocrystal-monohydrate), furosemide, levoFLOXacin, amLODIPine, FeroSuL, and ergocalciferol.    Domi was seen today for follow-up and diabetes.    Diagnoses and all orders for this visit:    Allergic rhinitis, unspecified seasonality, unspecified trigger    Need for vaccination  -     Influenza (FLUAD) - Quadrivalent (Adjuvanted) *Preferred* (65+) (PF)    Hypertension, unspecified type    Class 3 severe obesity due to excess calories with serious comorbidity and body mass index (BMI) of 50.0 to 59.9 in adult    Gastroesophageal reflux disease, unspecified whether esophagitis present         [unfilled]  Orders Placed This Encounter   Procedures    Influenza (FLUAD) - Quadrivalent (Adjuvanted) *Preferred* (65+) (PF)

## 2024-01-03 RX ORDER — AZITHROMYCIN 250 MG/1
TABLET, FILM COATED ORAL
Qty: 6 TABLET | Refills: 1 | Status: SHIPPED | OUTPATIENT
Start: 2024-01-03 | End: 2024-01-08

## 2024-01-03 RX ORDER — PREDNISONE 20 MG/1
40 TABLET ORAL DAILY
Qty: 10 TABLET | Refills: 0 | Status: SHIPPED | OUTPATIENT
Start: 2024-01-03

## 2024-01-09 DIAGNOSIS — Z71.89 COMPLEX CARE COORDINATION: ICD-10-CM

## 2024-03-04 RX ORDER — OMEPRAZOLE 20 MG/1
CAPSULE, DELAYED RELEASE ORAL
Qty: 90 CAPSULE | Refills: 6 | Status: SHIPPED | OUTPATIENT
Start: 2024-03-04 | End: 2024-03-05 | Stop reason: SDUPTHER

## 2024-03-04 RX ORDER — AMLODIPINE BESYLATE 5 MG/1
TABLET ORAL
Qty: 90 TABLET | Refills: 0 | Status: SHIPPED | OUTPATIENT
Start: 2024-03-04 | End: 2024-03-05 | Stop reason: SDUPTHER

## 2024-03-04 RX ORDER — ERGOCALCIFEROL 1.25 MG/1
CAPSULE ORAL
Qty: 12 CAPSULE | Refills: 0 | Status: SHIPPED | OUTPATIENT
Start: 2024-03-04 | End: 2024-03-05 | Stop reason: SDUPTHER

## 2024-03-04 RX ORDER — FERROUS SULFATE TAB 325 MG (65 MG ELEMENTAL FE) 325 (65 FE) MG
TAB ORAL
Qty: 270 TABLET | Refills: 0 | Status: SHIPPED | OUTPATIENT
Start: 2024-03-04 | End: 2024-05-30

## 2024-03-06 RX ORDER — OMEPRAZOLE 20 MG/1
20 CAPSULE, DELAYED RELEASE ORAL DAILY
Qty: 90 CAPSULE | Refills: 6 | Status: SHIPPED | OUTPATIENT
Start: 2024-03-06

## 2024-03-06 RX ORDER — AMLODIPINE BESYLATE 5 MG/1
5 TABLET ORAL DAILY
Qty: 90 TABLET | Refills: 3 | Status: SHIPPED | OUTPATIENT
Start: 2024-03-06

## 2024-03-06 RX ORDER — ERGOCALCIFEROL 1.25 MG/1
50000 CAPSULE ORAL
Qty: 12 CAPSULE | Refills: 3 | Status: SHIPPED | OUTPATIENT
Start: 2024-03-06

## 2024-04-15 RX ORDER — AZITHROMYCIN 250 MG/1
TABLET, FILM COATED ORAL
Qty: 6 TABLET | Refills: 1 | Status: SHIPPED | OUTPATIENT
Start: 2024-04-15 | End: 2024-04-20

## 2024-04-15 RX ORDER — BENZONATATE 200 MG/1
200 CAPSULE ORAL 3 TIMES DAILY PRN
Qty: 30 CAPSULE | Refills: 3 | Status: SHIPPED | OUTPATIENT
Start: 2024-04-15 | End: 2024-04-25

## 2024-05-30 RX ORDER — FERROUS SULFATE TAB 325 MG (65 MG ELEMENTAL FE) 325 (65 FE) MG
TAB ORAL
Qty: 270 TABLET | Refills: 0 | Status: SHIPPED | OUTPATIENT
Start: 2024-05-30

## 2024-07-01 RX ORDER — NITROFURANTOIN 25; 75 MG/1; MG/1
100 CAPSULE ORAL 2 TIMES DAILY
Qty: 14 CAPSULE | Refills: 1 | Status: SHIPPED | OUTPATIENT
Start: 2024-07-01

## 2024-08-09 DIAGNOSIS — Z71.89 COMPLEX CARE COORDINATION: ICD-10-CM

## 2024-08-28 RX ORDER — FERROUS SULFATE TAB 325 MG (65 MG ELEMENTAL FE) 325 (65 FE) MG
TAB ORAL
Qty: 270 TABLET | Refills: 0 | Status: SHIPPED | OUTPATIENT
Start: 2024-08-28

## 2024-09-11 ENCOUNTER — OFFICE VISIT (OUTPATIENT)
Dept: FAMILY MEDICINE | Facility: CLINIC | Age: 79
End: 2024-09-11
Payer: COMMERCIAL

## 2024-09-11 VITALS
DIASTOLIC BLOOD PRESSURE: 65 MMHG | HEIGHT: 67 IN | OXYGEN SATURATION: 95 % | WEIGHT: 293 LBS | HEART RATE: 91 BPM | TEMPERATURE: 98 F | BODY MASS INDEX: 45.99 KG/M2 | RESPIRATION RATE: 18 BRPM | SYSTOLIC BLOOD PRESSURE: 107 MMHG

## 2024-09-11 DIAGNOSIS — K21.9 GASTROESOPHAGEAL REFLUX DISEASE, UNSPECIFIED WHETHER ESOPHAGITIS PRESENT: ICD-10-CM

## 2024-09-11 DIAGNOSIS — I10 ESSENTIAL HYPERTENSION: ICD-10-CM

## 2024-09-11 DIAGNOSIS — R53.83 FATIGUE, UNSPECIFIED TYPE: ICD-10-CM

## 2024-09-11 DIAGNOSIS — R10.10 PAIN OF UPPER ABDOMEN: ICD-10-CM

## 2024-09-11 DIAGNOSIS — Z76.89 ENCOUNTER TO ESTABLISH CARE: ICD-10-CM

## 2024-09-11 DIAGNOSIS — E78.5 HYPERLIPIDEMIA, UNSPECIFIED HYPERLIPIDEMIA TYPE: ICD-10-CM

## 2024-09-11 DIAGNOSIS — I10 HYPERTENSION, UNSPECIFIED TYPE: ICD-10-CM

## 2024-09-11 DIAGNOSIS — R54 FRAILTY SYNDROME IN GERIATRIC PATIENT: Primary | ICD-10-CM

## 2024-09-11 DIAGNOSIS — R07.9 CHEST PAIN, UNSPECIFIED TYPE: ICD-10-CM

## 2024-09-11 DIAGNOSIS — N30.00 ACUTE CYSTITIS WITHOUT HEMATURIA: ICD-10-CM

## 2024-09-11 DIAGNOSIS — J45.909 ASTHMA, UNSPECIFIED ASTHMA SEVERITY, UNSPECIFIED WHETHER COMPLICATED, UNSPECIFIED WHETHER PERSISTENT: ICD-10-CM

## 2024-09-11 DIAGNOSIS — Z13.1 SCREENING FOR DIABETES MELLITUS: ICD-10-CM

## 2024-09-11 LAB
ALBUMIN SERPL BCP-MCNC: 3.4 G/DL (ref 3.5–5)
ALBUMIN/GLOB SERPL: 0.7 {RATIO}
ALP SERPL-CCNC: 113 U/L (ref 55–142)
ALT SERPL W P-5'-P-CCNC: 15 U/L (ref 13–56)
ANION GAP SERPL CALCULATED.3IONS-SCNC: 11 MMOL/L (ref 7–16)
AST SERPL W P-5'-P-CCNC: 11 U/L (ref 15–37)
BACTERIA #/AREA URNS HPF: ABNORMAL /HPF
BASOPHILS # BLD AUTO: 0.05 K/UL (ref 0–0.2)
BASOPHILS NFR BLD AUTO: 0.4 % (ref 0–1)
BILIRUB SERPL-MCNC: 0.4 MG/DL (ref ?–1.2)
BILIRUB UR QL STRIP: NEGATIVE
BUN SERPL-MCNC: 18 MG/DL (ref 7–18)
BUN/CREAT SERPL: 13 (ref 6–20)
CALCIUM SERPL-MCNC: 10 MG/DL (ref 8.5–10.1)
CHLORIDE SERPL-SCNC: 102 MMOL/L (ref 98–107)
CHOLEST SERPL-MCNC: 193 MG/DL (ref 0–200)
CHOLEST/HDLC SERPL: 3.6 {RATIO}
CLARITY UR: ABNORMAL
CO2 SERPL-SCNC: 31 MMOL/L (ref 21–32)
COLOR UR: YELLOW
CREAT SERPL-MCNC: 1.43 MG/DL (ref 0.55–1.02)
DIFFERENTIAL METHOD BLD: ABNORMAL
EGFR (NO RACE VARIABLE) (RUSH/TITUS): 38 ML/MIN/1.73M2
EOSINOPHIL # BLD AUTO: 0.09 K/UL (ref 0–0.5)
EOSINOPHIL NFR BLD AUTO: 0.7 % (ref 1–4)
ERYTHROCYTE [DISTWIDTH] IN BLOOD BY AUTOMATED COUNT: 14.8 % (ref 11.5–14.5)
EST. AVERAGE GLUCOSE BLD GHB EST-MCNC: 108 MG/DL
GLOBULIN SER-MCNC: 5 G/DL (ref 2–4)
GLUCOSE SERPL-MCNC: 111 MG/DL (ref 74–106)
GLUCOSE UR STRIP-MCNC: NORMAL MG/DL
HBA1C MFR BLD HPLC: 5.4 % (ref 4.5–6.6)
HCT VFR BLD AUTO: 41.7 % (ref 38–47)
HDLC SERPL-MCNC: 53 MG/DL (ref 40–60)
HGB BLD-MCNC: 12.2 G/DL (ref 12–16)
HYALINE CASTS #/AREA URNS LPF: ABNORMAL /LPF
IMM GRANULOCYTES # BLD AUTO: 0.05 K/UL (ref 0–0.04)
IMM GRANULOCYTES NFR BLD: 0.4 % (ref 0–0.4)
KETONES UR STRIP-SCNC: ABNORMAL MG/DL
LDLC SERPL CALC-MCNC: 113 MG/DL
LDLC/HDLC SERPL: 2.1 {RATIO}
LEUKOCYTE ESTERASE UR QL STRIP: ABNORMAL
LYMPHOCYTES # BLD AUTO: 2.93 K/UL (ref 1–4.8)
LYMPHOCYTES NFR BLD AUTO: 24.2 % (ref 27–41)
MCH RBC QN AUTO: 27.2 PG (ref 27–31)
MCHC RBC AUTO-ENTMCNC: 29.3 G/DL (ref 32–36)
MCV RBC AUTO: 93.1 FL (ref 80–96)
MONOCYTES # BLD AUTO: 0.62 K/UL (ref 0–0.8)
MONOCYTES NFR BLD AUTO: 5.1 % (ref 2–6)
MPC BLD CALC-MCNC: 12 FL (ref 9.4–12.4)
MUCOUS, UA: ABNORMAL /LPF
NEUTROPHILS # BLD AUTO: 8.39 K/UL (ref 1.8–7.7)
NEUTROPHILS NFR BLD AUTO: 69.2 % (ref 53–65)
NITRITE UR QL STRIP: NEGATIVE
NONHDLC SERPL-MCNC: 140 MG/DL
NRBC # BLD AUTO: 0 X10E3/UL
NRBC, AUTO (.00): 0 %
PH UR STRIP: 5.5 PH UNITS
PLATELET # BLD AUTO: 510 K/UL (ref 150–400)
POTASSIUM SERPL-SCNC: 3.1 MMOL/L (ref 3.5–5.1)
PROT SERPL-MCNC: 8.4 G/DL (ref 6.4–8.2)
PROT UR QL STRIP: 50
RBC # BLD AUTO: 4.48 M/UL (ref 4.2–5.4)
RBC # UR STRIP: NEGATIVE /UL
RBC #/AREA URNS HPF: 4 /HPF
SODIUM SERPL-SCNC: 141 MMOL/L (ref 136–145)
SP GR UR STRIP: 1.03
SQUAMOUS #/AREA URNS LPF: ABNORMAL /HPF
TRIGL SERPL-MCNC: 133 MG/DL (ref 35–150)
UROBILINOGEN UR STRIP-ACNC: 2 MG/DL
VLDLC SERPL-MCNC: 27 MG/DL
WBC # BLD AUTO: 12.13 K/UL (ref 4.5–11)
WBC #/AREA URNS HPF: 5 /HPF

## 2024-09-11 PROCEDURE — 85025 COMPLETE CBC W/AUTO DIFF WBC: CPT | Mod: ,,, | Performed by: CLINICAL MEDICAL LABORATORY

## 2024-09-11 PROCEDURE — 83036 HEMOGLOBIN GLYCOSYLATED A1C: CPT | Mod: ,,, | Performed by: CLINICAL MEDICAL LABORATORY

## 2024-09-11 PROCEDURE — 80053 COMPREHEN METABOLIC PANEL: CPT | Mod: ,,, | Performed by: CLINICAL MEDICAL LABORATORY

## 2024-09-11 PROCEDURE — 81001 URINALYSIS AUTO W/SCOPE: CPT | Mod: ,,, | Performed by: CLINICAL MEDICAL LABORATORY

## 2024-09-11 PROCEDURE — 80061 LIPID PANEL: CPT | Mod: ,,, | Performed by: CLINICAL MEDICAL LABORATORY

## 2024-09-11 RX ORDER — FUROSEMIDE 40 MG/1
40 TABLET ORAL 2 TIMES DAILY
Qty: 60 TABLET | Refills: 11 | Status: SHIPPED | OUTPATIENT
Start: 2024-09-11 | End: 2025-09-11

## 2024-09-11 RX ORDER — LOSARTAN POTASSIUM AND HYDROCHLOROTHIAZIDE 12.5; 1 MG/1; MG/1
1 TABLET ORAL DAILY
Qty: 90 TABLET | Refills: 3 | Status: SHIPPED | OUTPATIENT
Start: 2024-09-11

## 2024-09-11 RX ORDER — AMLODIPINE BESYLATE 5 MG/1
5 TABLET ORAL DAILY
Qty: 90 TABLET | Refills: 3 | Status: SHIPPED | OUTPATIENT
Start: 2024-09-11

## 2024-09-11 RX ORDER — PRAVASTATIN SODIUM 20 MG/1
20 TABLET ORAL DAILY
Qty: 90 TABLET | Refills: 3 | Status: SHIPPED | OUTPATIENT
Start: 2024-09-11 | End: 2025-09-11

## 2024-09-11 RX ORDER — OMEPRAZOLE 20 MG/1
20 CAPSULE, DELAYED RELEASE ORAL DAILY
Qty: 90 CAPSULE | Refills: 6 | Status: SHIPPED | OUTPATIENT
Start: 2024-09-11

## 2024-09-11 RX ORDER — BUDESONIDE AND FORMOTEROL FUMARATE DIHYDRATE 160; 4.5 UG/1; UG/1
2 AEROSOL RESPIRATORY (INHALATION) 2 TIMES DAILY
Qty: 6 G | Refills: 0 | Status: SHIPPED | OUTPATIENT
Start: 2024-09-11 | End: 2024-10-11

## 2024-09-11 RX ORDER — ERGOCALCIFEROL 1.25 MG/1
50000 CAPSULE ORAL
Qty: 12 CAPSULE | Refills: 3 | Status: SHIPPED | OUTPATIENT
Start: 2024-09-11

## 2024-09-11 NOTE — ASSESSMENT & PLAN NOTE
09/11  - Patient is a new patient  - Patient previous PCP Dr. Dixon  - Patient has a history of HTN, HLD, Obesity, Mesenteric Ischemia Colitis   - Patient has past screening for colonoscopy , Low-dose CT lung( history of smoking) and Mammogram  - Patient does not smoke or drink alcohol  - At visit, medication recon; refilled medications at visit  - Pending labs CBC, CMP, A1c, UA and Lipid panel  -RTC in 2- week to address more health conditions

## 2024-09-11 NOTE — ASSESSMENT & PLAN NOTE
09/11  - Blood pressure normative at visit  - States does not check BP at home  -Given BP log at visit and educated the importance of checking blood pressure  - Refilled Amlodipine 5 mg daily; Hyzaar 100-12.5 mg daily  - Refilled Furosemide 40 mg and Pravastatin 20 mg daily

## 2024-09-11 NOTE — ASSESSMENT & PLAN NOTE
09/11  - Patient uses wheelchair for mobility  - States experiencing intermittent chest discomfort described as dull sensation at rest and with movement;non-radiating  - States that she will take her  ASA for chest discomfort  - Encouraged patient to visit Cardiology for possible CAD and/or ACS  - Referral sent to cardiology   - Continue to monitor

## 2024-09-11 NOTE — ASSESSMENT & PLAN NOTE
09/11  - Reports having upper abdominal discomfort  - Described at sharp pain  - History of Mesenteric Ischemia Colitis  - Patient declined surgery  - Will continue to monitor lipids and continue statin   - Will re-address at next visit

## 2024-09-11 NOTE — PROGRESS NOTES
Forrest Gutierrez M.D.  [address], [place] [address]  Phone: [phone]     Subjective     Name: Domi Singh   YOB: 1945 (79 y.o.)  MRN: [id number]  Visit Date: 9/11/2024   Chief Complaint: Establish Care        HISTORY OF PRESENT ILLNESS:  Patient is a 79-year-old female with a past medical history of HTN, Obesity, HLD and ischemic Colitis who presents to the [facility] to establish care and medication refills. Reports that she was a patient of Dr. Dixon. She is her to establish care. Mentioned she has experienced intermittent chest discomfort for awhile at resting or movement. Denied any radiation of pain. In addition, experiencing abdominal discomfort with meals. Patient has a history of Mesenteric ischemia Colitis disclosed on abdominal CTA a few years ago. She has past screenings for colon cancer and breast cancer that were unremarkable. States that her diet is not the best. She is adherent to HTN medications. Educated and encouraged to check blood pressure at home. At visit, ordered routine labs. RTC in 2-weeks.          PAST MEDICAL HISTORY:  Significant Diagnoses - Patient  has a past medical history of Allergy, GERD (gastroesophageal reflux disease), Hypertension, and Obesities, morbid.  Medications - Patient has a current medication list which includes the following long-term medication(s): albuterol, amlodipine, budesonide-formoterol 160-4.5 mcg, furosemide, losartan-hydrochlorothiazide 100-12.5 mg, omeprazole, and pravastatin.   Allergies - Patient has No Known Allergies.  Surgeries - Patient  has a past surgical history that includes Cataract extraction, bilateral.  Family History - Patient family history includes Diabetes in her father, mother, and sister; Hypertension in her brother, father, mother, and sister.      SOCIAL HISTORY:  Tobacco - Patient  reports that she quit smoking about 18 years ago. Her smoking use included cigarettes. She started smoking  "about 54 years ago. She has a 54 pack-year smoking history. She has never used smokeless tobacco.   Alcohol - Patient  reports no history of alcohol use.   Recreational Drugs - Patient  reports no history of drug use.       Review of Systems   All other systems reviewed and are negative.         Past Medical History:   Diagnosis Date    Allergy     GERD (gastroesophageal reflux disease)     Hypertension     Obesities, morbid         Review of patient's allergies indicates:  No Known Allergies     Past Surgical History:   Procedure Laterality Date    CATARACT EXTRACTION, BILATERAL          Family History   Problem Relation Name Age of Onset    Diabetes Mother      Hypertension Mother      Diabetes Father      Hypertension Father      Diabetes Sister      Hypertension Sister      Hypertension Brother         Current Outpatient Medications   Medication Instructions    albuterol (PROVENTIL/VENTOLIN HFA) 90 mcg/actuation inhaler 2 puffs, Inhalation, Every 6 hours PRN, Rescue    amLODIPine (NORVASC) 5 mg, Oral, Daily    budesonide-formoterol 160-4.5 mcg (SYMBICORT) 160-4.5 mcg/actuation HFAA 2 puffs, Inhalation, 2 times daily, Controller    ergocalciferol (ERGOCALCIFEROL) 50,000 Units, Oral, Every 7 days    FEROSUL 325 mg (65 mg iron) Tab tablet Take 1 tablet by mouth three times daily.    furosemide (LASIX) 40 mg, Oral, 2 times daily    losartan-hydrochlorothiazide 100-12.5 mg (HYZAAR) 100-12.5 mg Tab 1 tablet, Oral, Daily    omeprazole (PRILOSEC) 20 mg, Oral, Daily    pravastatin (PRAVACHOL) 20 mg, Oral, Daily        Objective     /65 (BP Location: Right arm, Patient Position: Sitting, BP Method: Large (Automatic))   Pulse 91   Temp 98.1 °F (36.7 °C) (Oral)   Resp 18   Ht 5' 7" (1.702 m)   Wt (!) 144.7 kg (319 lb)   SpO2 95%   BMI 49.96 kg/m²     Physical Exam  Constitutional:       General: She is not in acute distress.     Appearance: Normal appearance. She is not ill-appearing.   HENT:      Head: " Normocephalic and atraumatic.      Right Ear: Tympanic membrane normal.      Left Ear: Tympanic membrane normal.   Cardiovascular:      Rate and Rhythm: Normal rate.      Heart sounds: No murmur heard.     No friction rub. No gallop.   Pulmonary:      Effort: No respiratory distress.      Breath sounds: No wheezing or rhonchi.   Abdominal:      General: There is no distension.      Palpations: There is no mass.      Tenderness: There is abdominal tenderness.   Musculoskeletal:      Right lower leg: No edema.      Left lower leg: Edema present.      Comments: Left Lower Leg edema Pitting 2mm depth    Skin:     Findings: No bruising or rash.   Neurological:      Mental Status: She is alert.   Psychiatric:         Mood and Affect: Mood normal.          All recently obtained labs have been reviewed and discussed in detail with the patient.   Assessment     1. Frailty syndrome in geriatric patient    2. Pain of upper abdomen    3. Essential hypertension    4. Hypertension, unspecified type    5. Hyperlipidemia, unspecified hyperlipidemia type    6. Screening for diabetes mellitus    7. Fatigue, unspecified type    8. Encounter to establish care    9. Gastroesophageal reflux disease, unspecified whether esophagitis present    10. Asthma, unspecified asthma severity, unspecified whether complicated, unspecified whether persistent    11. Acute cystitis without hematuria    12. Chest pain, unspecified type         Plan     Problem List Items Addressed This Visit          Cardiac/Vascular    Hypertension    Relevant Medications    amLODIPine (NORVASC) 5 MG tablet    furosemide (LASIX) 40 MG tablet    losartan-hydrochlorothiazide 100-12.5 mg (HYZAAR) 100-12.5 mg Tab    Other Relevant Orders    Comprehensive Metabolic Panel    Essential hypertension     09/11  - Blood pressure normative at visit  - States does not check BP at home  -Given BP log at visit and educated the importance of checking blood pressure  - Refilled  Amlodipine 5 mg daily; Hyzaar 100-12.5 mg daily  - Refilled Furosemide 40 mg and Pravastatin 20 mg daily           Hyperlipidemia     09/11  - Lipid panel pending          Relevant Medications    pravastatin (PRAVACHOL) 20 MG tablet    Other Relevant Orders    Lipid Panel       Endocrine    Screening for diabetes mellitus     09/11  - Pending A1c   - Continue to monitor          Relevant Orders    Hemoglobin A1C       GI    Gastroesophageal reflux disease     09/11  - Refilled Omeprazole          Relevant Medications    omeprazole (PRILOSEC) 20 MG capsule    Pain of upper abdomen     09/11  - Reports having upper abdominal discomfort  - Described at sharp pain  - History of Mesenteric Ischemia Colitis  - Patient declined surgery  - Will continue to monitor lipids and continue statin   - Will re-address at next visit              Other    Fatigue    Relevant Medications    ergocalciferol (ERGOCALCIFEROL) 50,000 unit Cap    Other Relevant Orders    CBC Auto Differential    Encounter to establish care     09/11  - Patient is a new patient  - Patient previous PCP Dr. Dixon  - Patient has a history of HTN, HLD, Obesity, Mesenteric Ischemia Colitis   - Patient has past screening for colonoscopy , Low-dose CT lung( history of smoking) and Mammogram  - Patient does not smoke or drink alcohol  - At visit, medication recon; refilled medications at visit  - Pending labs CBC, CMP, A1c, UA and Lipid panel  -RTC in 2- week to address more health conditions           Frailty syndrome in geriatric patient - Primary     09/11  - Patient uses wheelchair for mobility  - States experiencing intermittent chest discomfort described as dull sensation at rest and with movement;non-radiating  - States that she will take her  ASA for chest discomfort  - Encouraged patient to visit Cardiology for possible CAD and/or ACS  - Referral sent to cardiology   - Continue to monitor             Other Visit Diagnoses       Asthma, unspecified  asthma severity, unspecified whether complicated, unspecified whether persistent        Relevant Medications    budesonide-formoterol 160-4.5 mcg (SYMBICORT) 160-4.5 mcg/actuation HFAA    Acute cystitis without hematuria        Relevant Orders    Urinalysis, Reflex to Urine Culture    Chest pain, unspecified type        Relevant Orders    Ambulatory referral/consult to Cardiology              All orders:  Orders Placed This Encounter    CBC Auto Differential    Comprehensive Metabolic Panel    Lipid Panel    Hemoglobin A1C    CBC with Differential    Urinalysis, Reflex to Urine Culture    Ambulatory referral/consult to Cardiology    amLODIPine (NORVASC) 5 MG tablet    furosemide (LASIX) 40 MG tablet    omeprazole (PRILOSEC) 20 MG capsule    pravastatin (PRAVACHOL) 20 MG tablet    losartan-hydrochlorothiazide 100-12.5 mg (HYZAAR) 100-12.5 mg Tab    budesonide-formoterol 160-4.5 mcg (SYMBICORT) 160-4.5 mcg/actuation HFAA    ergocalciferol (ERGOCALCIFEROL) 50,000 unit Cap          Follow up in about 2 weeks (around 9/25/2024).    Instructed patient that if symptoms fail to improve or worsen patient should seek immediate medical attention or report to the nearest emergency department. Patient expressed verbal agreement and understanding to this plan of care.   Forrest Gutierrez MD  Family Medicine Residency PGY-2  [facility]

## 2024-09-13 DIAGNOSIS — N30.00 ACUTE CYSTITIS WITHOUT HEMATURIA: Primary | ICD-10-CM

## 2024-09-13 RX ORDER — SULFAMETHOXAZOLE AND TRIMETHOPRIM 400; 80 MG/1; MG/1
1 TABLET ORAL 2 TIMES DAILY
Qty: 6 TABLET | Refills: 0 | Status: SHIPPED | OUTPATIENT
Start: 2024-09-13 | End: 2024-09-16

## 2024-11-25 RX ORDER — FERROUS SULFATE 325(65) MG
TABLET ORAL
Qty: 270 TABLET | Refills: 0 | Status: SHIPPED | OUTPATIENT
Start: 2024-11-25

## 2024-12-11 ENCOUNTER — OFFICE VISIT (OUTPATIENT)
Dept: FAMILY MEDICINE | Facility: CLINIC | Age: 79
End: 2024-12-11
Payer: COMMERCIAL

## 2024-12-11 VITALS
HEIGHT: 67 IN | DIASTOLIC BLOOD PRESSURE: 69 MMHG | WEIGHT: 293 LBS | OXYGEN SATURATION: 95 % | HEART RATE: 85 BPM | RESPIRATION RATE: 18 BRPM | BODY MASS INDEX: 45.99 KG/M2 | SYSTOLIC BLOOD PRESSURE: 122 MMHG | TEMPERATURE: 98 F

## 2024-12-11 DIAGNOSIS — Z13.1 SCREENING FOR DIABETES MELLITUS: ICD-10-CM

## 2024-12-11 DIAGNOSIS — Z23 COVID-19 VACCINE ADMINISTERED: ICD-10-CM

## 2024-12-11 DIAGNOSIS — R73.9 HYPERGLYCEMIA: ICD-10-CM

## 2024-12-11 DIAGNOSIS — I10 HYPERTENSION, UNSPECIFIED TYPE: Primary | ICD-10-CM

## 2024-12-11 DIAGNOSIS — M17.0 PRIMARY OSTEOARTHRITIS OF BOTH KNEES: ICD-10-CM

## 2024-12-11 DIAGNOSIS — Z23 FLU VACCINE NEED: ICD-10-CM

## 2024-12-11 DIAGNOSIS — J45.909 ASTHMA, UNSPECIFIED ASTHMA SEVERITY, UNSPECIFIED WHETHER COMPLICATED, UNSPECIFIED WHETHER PERSISTENT: ICD-10-CM

## 2024-12-11 LAB
ALBUMIN SERPL BCP-MCNC: 3.2 G/DL (ref 3.4–4.8)
ALBUMIN/GLOB SERPL: 0.9 {RATIO}
ALP SERPL-CCNC: 99 U/L (ref 40–150)
ALT SERPL W P-5'-P-CCNC: 7 U/L
ANION GAP SERPL CALCULATED.3IONS-SCNC: 12 MMOL/L (ref 7–16)
AST SERPL W P-5'-P-CCNC: 10 U/L (ref 5–34)
BILIRUB SERPL-MCNC: 0.3 MG/DL
BUN SERPL-MCNC: 20 MG/DL (ref 10–20)
BUN/CREAT SERPL: 19 (ref 6–20)
CALCIUM SERPL-MCNC: 9.1 MG/DL (ref 8.4–10.2)
CHLORIDE SERPL-SCNC: 100 MMOL/L (ref 98–107)
CHOLEST SERPL-MCNC: 184 MG/DL
CHOLEST/HDLC SERPL: 5 {RATIO}
CO2 SERPL-SCNC: 30 MMOL/L (ref 23–31)
CREAT SERPL-MCNC: 1.08 MG/DL (ref 0.55–1.02)
EGFR (NO RACE VARIABLE) (RUSH/TITUS): 53 ML/MIN/1.73M2
EST. AVERAGE GLUCOSE BLD GHB EST-MCNC: 105 MG/DL
GLOBULIN SER-MCNC: 3.6 G/DL (ref 2–4)
GLUCOSE SERPL-MCNC: 111 MG/DL (ref 82–115)
HBA1C MFR BLD HPLC: 5.3 %
HDLC SERPL-MCNC: 37 MG/DL (ref 35–60)
LDLC SERPL CALC-MCNC: 124 MG/DL
LDLC/HDLC SERPL: 3.4 {RATIO}
NONHDLC SERPL-MCNC: 147 MG/DL
POTASSIUM SERPL-SCNC: 3.6 MMOL/L (ref 3.5–5.1)
PROT SERPL-MCNC: 6.8 G/DL (ref 5.8–7.6)
SODIUM SERPL-SCNC: 138 MMOL/L (ref 136–145)
TRIGL SERPL-MCNC: 117 MG/DL (ref 37–140)
VLDLC SERPL-MCNC: 23 MG/DL

## 2024-12-11 PROCEDURE — 1159F MED LIST DOCD IN RCRD: CPT | Mod: ,,, | Performed by: FAMILY MEDICINE

## 2024-12-11 PROCEDURE — 90653 IIV ADJUVANT VACCINE IM: CPT | Mod: ,,, | Performed by: FAMILY MEDICINE

## 2024-12-11 PROCEDURE — 99213 OFFICE O/P EST LOW 20 MIN: CPT | Mod: 25,GE,, | Performed by: FAMILY MEDICINE

## 2024-12-11 PROCEDURE — 3288F FALL RISK ASSESSMENT DOCD: CPT | Mod: ,,, | Performed by: FAMILY MEDICINE

## 2024-12-11 PROCEDURE — 3078F DIAST BP <80 MM HG: CPT | Mod: ,,, | Performed by: FAMILY MEDICINE

## 2024-12-11 PROCEDURE — 80053 COMPREHEN METABOLIC PANEL: CPT | Mod: ,,, | Performed by: CLINICAL MEDICAL LABORATORY

## 2024-12-11 PROCEDURE — G0008 ADMIN INFLUENZA VIRUS VAC: HCPCS | Mod: ,,, | Performed by: FAMILY MEDICINE

## 2024-12-11 PROCEDURE — 91322 SARSCOV2 VAC 50 MCG/0.5ML IM: CPT | Mod: ,,, | Performed by: FAMILY MEDICINE

## 2024-12-11 PROCEDURE — 80061 LIPID PANEL: CPT | Mod: ,,, | Performed by: CLINICAL MEDICAL LABORATORY

## 2024-12-11 PROCEDURE — 1160F RVW MEDS BY RX/DR IN RCRD: CPT | Mod: ,,, | Performed by: FAMILY MEDICINE

## 2024-12-11 PROCEDURE — 83036 HEMOGLOBIN GLYCOSYLATED A1C: CPT | Mod: ,,, | Performed by: CLINICAL MEDICAL LABORATORY

## 2024-12-11 PROCEDURE — 1101F PT FALLS ASSESS-DOCD LE1/YR: CPT | Mod: ,,, | Performed by: FAMILY MEDICINE

## 2024-12-11 PROCEDURE — 3074F SYST BP LT 130 MM HG: CPT | Mod: ,,, | Performed by: FAMILY MEDICINE

## 2024-12-11 PROCEDURE — 90480 ADMN SARSCOV2 VAC 1/ONLY CMP: CPT | Mod: ,,, | Performed by: FAMILY MEDICINE

## 2024-12-11 PROCEDURE — 1125F AMNT PAIN NOTED PAIN PRSNT: CPT | Mod: ,,, | Performed by: FAMILY MEDICINE

## 2024-12-11 RX ORDER — HYDROXYZINE PAMOATE 25 MG/1
25 CAPSULE ORAL EVERY 8 HOURS PRN
Qty: 90 CAPSULE | Refills: 2 | Status: SHIPPED | OUTPATIENT
Start: 2024-12-11 | End: 2025-03-11

## 2024-12-11 RX ORDER — FUROSEMIDE 40 MG/1
40 TABLET ORAL 2 TIMES DAILY
Qty: 180 TABLET | Refills: 0 | Status: SHIPPED | OUTPATIENT
Start: 2024-12-11 | End: 2025-03-11

## 2024-12-11 RX ORDER — BUDESONIDE AND FORMOTEROL FUMARATE DIHYDRATE 160; 4.5 UG/1; UG/1
2 AEROSOL RESPIRATORY (INHALATION) 2 TIMES DAILY
Qty: 6 G | Refills: 0 | Status: SHIPPED | OUTPATIENT
Start: 2024-12-11 | End: 2025-01-10

## 2024-12-11 RX ORDER — HYDROXYZINE PAMOATE 25 MG/1
25 CAPSULE ORAL EVERY 8 HOURS PRN
COMMUNITY
End: 2024-12-11 | Stop reason: SDUPTHER

## 2024-12-11 RX ORDER — HYDROXYZINE PAMOATE 25 MG/1
25 CAPSULE ORAL EVERY 8 HOURS PRN
Qty: 90 CAPSULE | Refills: 2 | Status: SHIPPED | OUTPATIENT
Start: 2024-12-11 | End: 2024-12-11 | Stop reason: SDUPTHER

## 2024-12-11 NOTE — ASSESSMENT & PLAN NOTE
12/11  - Reports that both knees are having bad pain  -Chronic issue for years  -She has not seen an Orthopedics; do recommend a visit  -Recommend to try Tylenol or discomfort; renal function is not the best for NSAID's  -PT referral placed  -Will continue to monitor

## 2024-12-11 NOTE — PROGRESS NOTES
Forrest Gutierrez MD    [address], [place] [address]  Phone: [phone]     Subjective     Name: Domi Singh   YOB: 1945 (79 y.o.)  MRN: [id number]  Visit Date: 12/11/2024   Chief Complaint: Follow-up (X2 months ), Knee Pain (Along with swelling that goes from her knee to her ankle, and states that it maybe her arthritis. Rates the pain as a 10/10./Wants to know if she can get something prescribed for her arthritis. ), and Medication Refill (furosemide (LASIX) 40 MG tablet )        HISTORY OF PRESENT ILLNESS:  Patient is a 79-year-old female with a past medical history of HTN, Obesity, HLD and ischemic Colitis who presents to the [facility] to follow-up. States she is doing well. She is experiencing bilaterally knee discomfort. At this time, recommend to use Tylenol for pain and referral to PT. Patient is excited that she has lost weight due to smaller portions for food. RTC in 3-months.       PAST MEDICAL HISTORY:  Significant Diagnoses - Patient  has a past medical history of Allergy, GERD (gastroesophageal reflux disease), Hypertension, and Obesities, morbid.  Medications - Patient has a current medication list which includes the following long-term medication(s): albuterol, amlodipine, budesonide-formoterol 160-4.5 mcg, furosemide, losartan-hydrochlorothiazide 100-12.5 mg, omeprazole, and pravastatin.   Allergies - Patient has No Known Allergies.  Surgeries - Patient  has a past surgical history that includes Cataract extraction, bilateral.  Family History - Patient family history includes Diabetes in her father, mother, and sister; Hypertension in her brother, father, mother, and sister.      SOCIAL HISTORY:  Tobacco - Patient  reports that she quit smoking about 18 years ago. Her smoking use included cigarettes. She started smoking about 54 years ago. She has a 54 pack-year smoking history. She has never used smokeless tobacco.   Alcohol - Patient  reports no history of  "alcohol use.   Recreational Drugs - Patient  reports no history of drug use.       Review of Systems   All other systems reviewed and are negative.       Past Medical History:   Diagnosis Date    Allergy     GERD (gastroesophageal reflux disease)     Hypertension     Obesities, morbid         Review of patient's allergies indicates:  No Known Allergies     Past Surgical History:   Procedure Laterality Date    CATARACT EXTRACTION, BILATERAL          Family History   Problem Relation Name Age of Onset    Diabetes Mother      Hypertension Mother      Diabetes Father      Hypertension Father      Diabetes Sister      Hypertension Sister      Hypertension Brother         Current Outpatient Medications   Medication Instructions    albuterol (PROVENTIL/VENTOLIN HFA) 90 mcg/actuation inhaler 2 puffs, Every 6 hours PRN    amLODIPine (NORVASC) 5 mg, Oral, Daily    budesonide-formoterol 160-4.5 mcg (SYMBICORT) 160-4.5 mcg/actuation HFAA 2 puffs, Inhalation, 2 times daily, Controller    ergocalciferol (ERGOCALCIFEROL) 50,000 Units, Oral, Every 7 days    FEROSUL 325 mg (65 mg iron) Tab tablet Take 1 tablet by mouth three times daily.    furosemide (LASIX) 40 mg, Oral, 2 times daily    hydrOXYzine pamoate (VISTARIL) 25 mg, Oral, Every 8 hours PRN    losartan-hydrochlorothiazide 100-12.5 mg (HYZAAR) 100-12.5 mg Tab 1 tablet, Oral, Daily    omeprazole (PRILOSEC) 20 mg, Oral, Daily    pravastatin (PRAVACHOL) 20 mg, Oral, Daily        Objective     /69 (BP Location: Left arm, Patient Position: Sitting)   Pulse 85   Temp 97.6 °F (36.4 °C) (Oral)   Resp 18   Ht 5' 7" (1.702 m)   Wt (!) 143.8 kg (317 lb)   SpO2 95%   BMI 49.65 kg/m²     Physical Exam  Vitals and nursing note reviewed.   Constitutional:       General: She is not in acute distress.     Appearance: Normal appearance. She is not ill-appearing.      Comments: Severe obese    HENT:      Head: Normocephalic and atraumatic.   Cardiovascular: "      Rate and Rhythm: Normal rate.      Heart sounds: No murmur heard.     No friction rub. No gallop.   Pulmonary:      Effort: No respiratory distress.      Breath sounds: No wheezing or rhonchi.   Abdominal:      General: There is no distension.      Tenderness: There is no abdominal tenderness.   Musculoskeletal:         General: Tenderness present.      Right lower leg: Edema present.      Left lower leg: Edema present.   Neurological:      Mental Status: She is alert.        All recently obtained labs have been reviewed and discussed in detail with the patient.   Assessment     1. Hypertension, unspecified type    2. Primary osteoarthritis of both knees    3. Asthma, unspecified asthma severity, unspecified whether complicated, unspecified whether persistent    4. Screening for diabetes mellitus    5. Hyperglycemia    6. COVID-19 vaccine administered    7. Flu vaccine need         Plan     Problem List Items Addressed This Visit          Pulmonary    Asthma    Relevant Medications    budesonide-formoterol 160-4.5 mcg (SYMBICORT) 160-4.5 mcg/actuation HFAA       Cardiac/Vascular    Hypertension - Primary     12/11  - Reports that she believes her BP at home is good  -Today , BP was normative  - Refilled anti-hypertensive agents at visit  -Continue to monitor   -RTC in 3-months          Relevant Medications    furosemide (LASIX) 40 MG tablet    hydrOXYzine pamoate (VISTARIL) 25 MG Cap    Other Relevant Orders    Lipid Panel    Comprehensive Metabolic Panel       ID    COVID-19 vaccine administered    Relevant Medications    COVID-19 (Moderna) 50 mcg/0.5 mL IM vaccine (>/= 11 yo) 0.5 mL (Completed)    Flu vaccine need    Relevant Medications    influenza (adjuvanted) (Fluad) 45 mcg/0.5 mL IM vaccine (> or = 66 yo) 0.5 mL (Start on 12/11/2024 11:45 AM)       Endocrine    Screening for diabetes mellitus    Relevant Orders    Hemoglobin A1C    Lipid Panel    Hyperglycemia    Relevant Orders    Hemoglobin A1C        Orthopedic    Primary osteoarthritis of both knees     12/11  - Reports that both knees are having bad pain  -Chronic issue for years  -She has not seen an Orthopedics; do recommend a visit  -Recommend to try Tylenol or discomfort; renal function is not the best for NSAID's  -PT referral placed  -Will continue to monitor          Relevant Orders    Ambulatory referral/consult to Physical/Occupational Therapy         All orders:  Orders Placed This Encounter    Hemoglobin A1C    Lipid Panel    Comprehensive Metabolic Panel    Ambulatory referral/consult to Physical/Occupational Therapy    furosemide (LASIX) 40 MG tablet    hydrOXYzine pamoate (VISTARIL) 25 MG Cap    budesonide-formoterol 160-4.5 mcg (SYMBICORT) 160-4.5 mcg/actuation HFAA    COVID-19 (Moderna) 50 mcg/0.5 mL IM vaccine (>/= 11 yo) 0.5 mL    influenza (adjuvanted) (Fluad) 45 mcg/0.5 mL IM vaccine (> or = 64 yo) 0.5 mL          Follow up in about 3 months (around 3/11/2025).    Instructed patient that if symptoms fail to improve or worsen patient should seek immediate medical attention or report to the nearest emergency department. Patient expressed verbal agreement and understanding to this plan of care.   Forrest Gutierrez MD  Family Medicine Residency PGY-2   [facility]

## 2024-12-11 NOTE — ASSESSMENT & PLAN NOTE
12/11  - Reports that she believes her BP at home is good  -Today , BP was normative  - Refilled anti-hypertensive agents at visit  -Continue to monitor   -RTC in 3-months

## 2025-03-11 ENCOUNTER — OFFICE VISIT (OUTPATIENT)
Dept: FAMILY MEDICINE | Facility: CLINIC | Age: 80
End: 2025-03-11
Payer: COMMERCIAL

## 2025-03-11 ENCOUNTER — RESULTS FOLLOW-UP (OUTPATIENT)
Dept: FAMILY MEDICINE | Facility: CLINIC | Age: 80
End: 2025-03-11

## 2025-03-11 VITALS
HEIGHT: 67 IN | OXYGEN SATURATION: 95 % | WEIGHT: 293 LBS | DIASTOLIC BLOOD PRESSURE: 91 MMHG | HEART RATE: 78 BPM | TEMPERATURE: 97 F | RESPIRATION RATE: 18 BRPM | BODY MASS INDEX: 45.99 KG/M2 | SYSTOLIC BLOOD PRESSURE: 116 MMHG

## 2025-03-11 DIAGNOSIS — M17.0 PRIMARY OSTEOARTHRITIS OF BOTH KNEES: ICD-10-CM

## 2025-03-11 DIAGNOSIS — Z23: ICD-10-CM

## 2025-03-11 DIAGNOSIS — J45.909 ASTHMA, UNSPECIFIED ASTHMA SEVERITY, UNSPECIFIED WHETHER COMPLICATED, UNSPECIFIED WHETHER PERSISTENT: ICD-10-CM

## 2025-03-11 DIAGNOSIS — Z86.39 HISTORY OF HYPERGLYCEMIA: ICD-10-CM

## 2025-03-11 DIAGNOSIS — E78.5 HYPERLIPIDEMIA, UNSPECIFIED HYPERLIPIDEMIA TYPE: ICD-10-CM

## 2025-03-11 DIAGNOSIS — M62.838 MUSCLE SPASM: ICD-10-CM

## 2025-03-11 DIAGNOSIS — I10 ESSENTIAL HYPERTENSION: Primary | ICD-10-CM

## 2025-03-11 DIAGNOSIS — L85.3 XEROSIS CUTIS: ICD-10-CM

## 2025-03-11 DIAGNOSIS — K55.1 CHRONIC MESENTERIC ISCHEMIA: ICD-10-CM

## 2025-03-11 LAB
ALBUMIN SERPL BCP-MCNC: 3 G/DL (ref 3.4–4.8)
ALBUMIN/GLOB SERPL: 0.7 {RATIO}
ALP SERPL-CCNC: 93 U/L (ref 40–150)
ALT SERPL W P-5'-P-CCNC: <7 U/L
ANION GAP SERPL CALCULATED.3IONS-SCNC: 12 MMOL/L (ref 7–16)
AST SERPL W P-5'-P-CCNC: 10 U/L (ref 11–45)
BILIRUB SERPL-MCNC: 0.4 MG/DL
BUN SERPL-MCNC: 12 MG/DL (ref 10–20)
BUN/CREAT SERPL: 15 (ref 6–20)
CALCIUM SERPL-MCNC: 9.5 MG/DL (ref 8.4–10.2)
CHLORIDE SERPL-SCNC: 105 MMOL/L (ref 98–107)
CHOLEST SERPL-MCNC: 164 MG/DL
CHOLEST/HDLC SERPL: 3.7 {RATIO}
CO2 SERPL-SCNC: 29 MMOL/L (ref 23–31)
CREAT SERPL-MCNC: 0.81 MG/DL (ref 0.55–1.02)
EGFR (NO RACE VARIABLE) (RUSH/TITUS): 74 ML/MIN/1.73M2
EST. AVERAGE GLUCOSE BLD GHB EST-MCNC: 103 MG/DL
GLOBULIN SER-MCNC: 4.2 G/DL (ref 2–4)
GLUCOSE SERPL-MCNC: 110 MG/DL (ref 82–115)
HBA1C MFR BLD HPLC: 5.2 %
HDLC SERPL-MCNC: 44 MG/DL (ref 35–60)
LDLC SERPL CALC-MCNC: 100 MG/DL
LDLC/HDLC SERPL: 2.3 {RATIO}
NONHDLC SERPL-MCNC: 120 MG/DL
POTASSIUM SERPL-SCNC: 3.9 MMOL/L (ref 3.5–5.1)
PROT SERPL-MCNC: 7.2 G/DL (ref 5.8–7.6)
SODIUM SERPL-SCNC: 142 MMOL/L (ref 136–145)
TRIGL SERPL-MCNC: 102 MG/DL (ref 37–140)
VLDLC SERPL-MCNC: 20 MG/DL

## 2025-03-11 PROCEDURE — 83036 HEMOGLOBIN GLYCOSYLATED A1C: CPT | Mod: GZ,,, | Performed by: CLINICAL MEDICAL LABORATORY

## 2025-03-11 PROCEDURE — 3074F SYST BP LT 130 MM HG: CPT | Mod: ,,, | Performed by: FAMILY MEDICINE

## 2025-03-11 PROCEDURE — 1159F MED LIST DOCD IN RCRD: CPT | Mod: ,,, | Performed by: FAMILY MEDICINE

## 2025-03-11 PROCEDURE — 1160F RVW MEDS BY RX/DR IN RCRD: CPT | Mod: ,,, | Performed by: FAMILY MEDICINE

## 2025-03-11 PROCEDURE — 90677 PCV20 VACCINE IM: CPT | Mod: GC,,, | Performed by: FAMILY MEDICINE

## 2025-03-11 PROCEDURE — G0009 ADMIN PNEUMOCOCCAL VACCINE: HCPCS | Mod: GC,,, | Performed by: FAMILY MEDICINE

## 2025-03-11 PROCEDURE — 1125F AMNT PAIN NOTED PAIN PRSNT: CPT | Mod: ,,, | Performed by: FAMILY MEDICINE

## 2025-03-11 PROCEDURE — 80053 COMPREHEN METABOLIC PANEL: CPT | Mod: ,,, | Performed by: CLINICAL MEDICAL LABORATORY

## 2025-03-11 PROCEDURE — 99214 OFFICE O/P EST MOD 30 MIN: CPT | Mod: 25,GC,, | Performed by: FAMILY MEDICINE

## 2025-03-11 PROCEDURE — 3080F DIAST BP >= 90 MM HG: CPT | Mod: ,,, | Performed by: FAMILY MEDICINE

## 2025-03-11 PROCEDURE — 80061 LIPID PANEL: CPT | Mod: ,,, | Performed by: CLINICAL MEDICAL LABORATORY

## 2025-03-11 RX ORDER — DICLOFENAC SODIUM 10 MG/G
2 GEL TOPICAL DAILY
Qty: 2 G | Refills: 0 | Status: SHIPPED | OUTPATIENT
Start: 2025-03-11 | End: 2025-04-10

## 2025-03-11 RX ORDER — AMMONIUM LACTATE 12 G/100G
LOTION TOPICAL
Qty: 225 G | Refills: 3 | Status: SHIPPED | OUTPATIENT
Start: 2025-03-11

## 2025-03-11 RX ORDER — BUDESONIDE AND FORMOTEROL FUMARATE DIHYDRATE 160; 4.5 UG/1; UG/1
2 AEROSOL RESPIRATORY (INHALATION) 2 TIMES DAILY
Qty: 6 G | Refills: 0 | Status: SHIPPED | OUTPATIENT
Start: 2025-03-11 | End: 2025-04-10

## 2025-03-11 RX ORDER — DICLOFENAC SODIUM 10 MG/G
2 GEL TOPICAL DAILY
Qty: 2 G | Refills: 0 | Status: SHIPPED | OUTPATIENT
Start: 2025-03-11 | End: 2025-03-11

## 2025-03-11 RX ORDER — AMLODIPINE BESYLATE 5 MG/1
5 TABLET ORAL DAILY
Qty: 90 TABLET | Refills: 0 | Status: SHIPPED | OUTPATIENT
Start: 2025-03-11 | End: 2025-06-09

## 2025-03-11 RX ORDER — LOSARTAN POTASSIUM AND HYDROCHLOROTHIAZIDE 12.5; 1 MG/1; MG/1
1 TABLET ORAL DAILY
Qty: 90 TABLET | Refills: 0 | Status: SHIPPED | OUTPATIENT
Start: 2025-03-11 | End: 2025-06-09

## 2025-03-11 NOTE — ASSESSMENT & PLAN NOTE
3/11  - History of abdominal pain  -Declined surgical procedures due to age  -Advised to continue Statin, BP controlled, and normal BM regimes  -Will continue to monitor

## 2025-03-11 NOTE — ASSESSMENT & PLAN NOTE
3/11  - States for years experiencing muscle spasm  -Concerned related to dehydration vs symptoms of chronic mesenteric ischemia  -Will get CMP and evaluate for electrolytes abnormalities  -encouraged to use heating pad for discomfort; patient is opposed for medications  -Will continue to monitor

## 2025-03-11 NOTE — ASSESSMENT & PLAN NOTE
3/11  - Blood pressure normative at visit  -Reports compliant with anti-hypertensive agents  -Will continue current therapy  - Routine labs pending; will follow   -RTC in 3-months or prn

## 2025-03-11 NOTE — PROGRESS NOTES
Forrest Gutierrez MD    [address], [place] [address]  Phone: [phone]     Subjective     Name: Domi Singh   YOB: 1945 (79 y.o.)  MRN: [id number]  Visit Date: 3/11/2025   Chief Complaint: Follow-up (X3 month follow up /ABD pain 10/10 chronic problem. )        HISTORY OF PRESENT ILLNESS:  Patient is a 79-year-old female with a past medical history of HTN, Obesity, HLD and ischemic Colitis who presents to the [facility] to follow-up. States she is doing well. Reports continue to have knee discomfort. She states will like to try home PT sessions. Mentioned having abdominal discomfort and muscle spasm. She is compliant with home medications and BP at visit is normative. RTC in 3-months.         PAST MEDICAL HISTORY:  Significant Diagnoses - Patient  has a past medical history of Allergy, GERD (gastroesophageal reflux disease), Hypertension, and Obesities, morbid.  Medications - Patient has a current medication list which includes the following long-term medication(s): albuterol, amlodipine, budesonide-formoterol 160-4.5 mcg, diclofenac sodium, furosemide, losartan-hydrochlorothiazide 100-12.5 mg, omeprazole, and pravastatin.   Allergies - Patient has no known allergies.  Surgeries - Patient  has a past surgical history that includes Cataract extraction, bilateral.  Family History - Patient family history includes Diabetes in her father, mother, and sister; Hypertension in her brother, father, mother, and sister.      SOCIAL HISTORY:  Tobacco - Patient  reports that she quit smoking about 19 years ago. Her smoking use included cigarettes. She started smoking about 55 years ago. She has a 54 pack-year smoking history. She has never used smokeless tobacco.   Alcohol - Patient  reports no history of alcohol use.   Recreational Drugs - Patient  reports no history of drug use.       Review of Systems   Gastrointestinal:  Positive for abdominal pain.   Musculoskeletal:  Positive for  "arthralgias.          Past Medical History:   Diagnosis Date    Allergy     GERD (gastroesophageal reflux disease)     Hypertension     Obesities, morbid         Review of patient's allergies indicates:  No Known Allergies     Past Surgical History:   Procedure Laterality Date    CATARACT EXTRACTION, BILATERAL          Family History   Problem Relation Name Age of Onset    Diabetes Mother      Hypertension Mother      Diabetes Father      Hypertension Father      Diabetes Sister      Hypertension Sister      Hypertension Brother         Current Outpatient Medications   Medication Instructions    albuterol (PROVENTIL/VENTOLIN HFA) 90 mcg/actuation inhaler 2 puffs, Every 6 hours PRN    amLODIPine (NORVASC) 5 mg, Oral, Daily    ammonium lactate (LAC-HYDRIN) 12 % lotion Topical (Top), As needed (PRN)    budesonide-formoterol 160-4.5 mcg (SYMBICORT) 160-4.5 mcg/actuation HFAA 2 puffs, Inhalation, 2 times daily, Controller    diclofenac sodium (VOLTAREN ARTHRITIS PAIN) 2 g, Topical (Top), Daily    ergocalciferol (ERGOCALCIFEROL) 50,000 Units, Oral, Every 7 days    FEROSUL 325 mg (65 mg iron) Tab tablet Take 1 tablet by mouth three times daily.    furosemide (LASIX) 40 mg, Oral, 2 times daily    hydrOXYzine pamoate (VISTARIL) 25 mg, Oral, Every 8 hours PRN    losartan-hydrochlorothiazide 100-12.5 mg (HYZAAR) 100-12.5 mg Tab 1 tablet, Oral, Daily    omeprazole (PRILOSEC) 20 mg, Oral, Daily    pravastatin (PRAVACHOL) 20 mg, Oral, Daily        Objective     BP (!) 116/91 (BP Location: Left arm, Patient Position: Sitting)   Pulse 78   Temp 97.4 °F (36.3 °C) (Oral)   Resp 18   Ht 5' 7" (1.702 m)   Wt (!) 143.3 kg (316 lb)   SpO2 95%   BMI 49.49 kg/m²     Physical Exam  Vitals and nursing note reviewed.   Constitutional:       General: She is not in acute distress.     Appearance: Normal appearance. She is obese. She is not ill-appearing.   Cardiovascular:      Rate and Rhythm: Normal rate.      Heart sounds: No murmur " heard.     No friction rub. No gallop.   Pulmonary:      Effort: No respiratory distress.      Breath sounds: No wheezing or rhonchi.   Abdominal:      General: Bowel sounds are normal. There is no distension.      Palpations: Abdomen is soft.      Tenderness: There is no abdominal tenderness. There is no guarding or rebound.   Musculoskeletal:         General: No swelling or tenderness.      Right lower leg: No edema.      Left lower leg: No edema.   Neurological:      Mental Status: She is alert. Mental status is at baseline.   Psychiatric:         Mood and Affect: Mood normal.          All recently obtained labs have been reviewed and discussed in detail with the patient.   Assessment     1. Essential hypertension    2. Hyperlipidemia, unspecified hyperlipidemia type    3. History of hyperglycemia    4. Asthma, unspecified asthma severity, unspecified whether complicated, unspecified whether persistent    5. Primary osteoarthritis of both knees    6. Muscle spasm    7. Need for vaccination for bacterial disease    8. Xerosis cutis    9. Chronic mesenteric ischemia         Plan     Problem List Items Addressed This Visit          Derm    Xerosis cutis    3/11  - Continue lotion for dry skin          Relevant Medications    ammonium lactate (LAC-HYDRIN) 12 % lotion       Pulmonary    Asthma    Relevant Medications    budesonide-formoterol 160-4.5 mcg (SYMBICORT) 160-4.5 mcg/actuation HFAA       Cardiac/Vascular    Essential hypertension - Primary    3/11  - Blood pressure normative at visit  -Reports compliant with anti-hypertensive agents  -Will continue current therapy  - Routine labs pending; will follow   -RTC in 3-months or prn          Relevant Medications    amLODIPine (NORVASC) 5 MG tablet    losartan-hydrochlorothiazide 100-12.5 mg (HYZAAR) 100-12.5 mg Tab    Other Relevant Orders    Comprehensive Metabolic Panel    Hyperlipidemia    3/11  - Lipid panel pending; will follow   - Continue current therapy           Relevant Orders    Lipid Panel       ID    Need for vaccination for bacterial disease    Relevant Medications    pneumoc 20-filiberto conj-dip cr(PF) (PREVNAR-20 (PF)) injection Syrg 0.5 mL       Endocrine    History of hyperglycemia    Relevant Orders    Hemoglobin A1C       GI    Chronic mesenteric ischemia    3/11  - History of abdominal pain  -Declined surgical procedures due to age  -Advised to continue Statin, BP controlled, and normal BM regimes  -Will continue to monitor             Orthopedic    Primary osteoarthritis of both knees    3/11  - Continue to have pain   -Started on Voltaren cream   -Home Physical Therapy referred  -Will continue to monitor          Relevant Medications    diclofenac sodium (VOLTAREN ARTHRITIS PAIN) 1 % Gel    Other Relevant Orders    Ambulatory Referral/Consult to Physical/Occupational Therapy    Muscle spasm    3/11  - States for years experiencing muscle spasm  -Concerned related to dehydration vs symptoms of chronic mesenteric ischemia  -Will get CMP and evaluate for electrolytes abnormalities  -encouraged to use heating pad for discomfort; patient is opposed for medications  -Will continue to monitor               All orders:  Orders Placed This Encounter    Comprehensive Metabolic Panel    Lipid Panel    Hemoglobin A1C    Ambulatory Referral/Consult to Physical/Occupational Therapy    diclofenac sodium (VOLTAREN ARTHRITIS PAIN) 1 % Gel    amLODIPine (NORVASC) 5 MG tablet    losartan-hydrochlorothiazide 100-12.5 mg (HYZAAR) 100-12.5 mg Tab    pneumoc 20-filiberto conj-dip cr(PF) (PREVNAR-20 (PF)) injection Syrg 0.5 mL    budesonide-formoterol 160-4.5 mcg (SYMBICORT) 160-4.5 mcg/actuation HFAA    ammonium lactate (LAC-HYDRIN) 12 % lotion          Follow up in about 3 months (around 6/11/2025).    Instructed patient that if symptoms fail to improve or worsen patient should seek immediate medical attention or report to the nearest emergency department. Patient expressed verbal  agreement and understanding to this plan of care.   Forrest Gutierrez MD  Family Medicine Residency PGY-2   [facility]

## 2025-03-11 NOTE — ASSESSMENT & PLAN NOTE
3/11  - Continue to have pain   -Started on Voltaren cream   -Home Physical Therapy referred  -Will continue to monitor

## 2025-03-28 DIAGNOSIS — D64.9 ANEMIA, UNSPECIFIED TYPE: ICD-10-CM

## 2025-03-28 DIAGNOSIS — L85.3 XEROSIS CUTIS: ICD-10-CM

## 2025-03-28 DIAGNOSIS — J45.909 ASTHMA, UNSPECIFIED ASTHMA SEVERITY, UNSPECIFIED WHETHER COMPLICATED, UNSPECIFIED WHETHER PERSISTENT: ICD-10-CM

## 2025-03-28 DIAGNOSIS — I10 HYPERTENSION, UNSPECIFIED TYPE: Primary | ICD-10-CM

## 2025-03-28 DIAGNOSIS — E78.5 HYPERLIPIDEMIA, UNSPECIFIED HYPERLIPIDEMIA TYPE: ICD-10-CM

## 2025-03-28 DIAGNOSIS — I10 ESSENTIAL HYPERTENSION: ICD-10-CM

## 2025-03-28 DIAGNOSIS — K21.9 GASTROESOPHAGEAL REFLUX DISEASE, UNSPECIFIED WHETHER ESOPHAGITIS PRESENT: ICD-10-CM

## 2025-03-28 DIAGNOSIS — M17.0 PRIMARY OSTEOARTHRITIS OF BOTH KNEES: ICD-10-CM

## 2025-03-28 DIAGNOSIS — R53.83 FATIGUE, UNSPECIFIED TYPE: ICD-10-CM

## 2025-03-28 RX ORDER — AMMONIUM LACTATE 12 G/100G
LOTION TOPICAL
Qty: 225 G | Refills: 3 | Status: SHIPPED | OUTPATIENT
Start: 2025-03-28

## 2025-03-28 RX ORDER — DICLOFENAC SODIUM 10 MG/G
2 GEL TOPICAL DAILY
Qty: 2 G | Refills: 0 | Status: SHIPPED | OUTPATIENT
Start: 2025-03-28 | End: 2025-04-27

## 2025-03-28 RX ORDER — FERROUS SULFATE 325(65) MG
325 TABLET ORAL DAILY
Qty: 90 TABLET | Refills: 1 | Status: SHIPPED | OUTPATIENT
Start: 2025-03-28

## 2025-03-28 RX ORDER — ALBUTEROL SULFATE 90 UG/1
2 INHALANT RESPIRATORY (INHALATION) EVERY 6 HOURS PRN
Status: CANCELLED | OUTPATIENT
Start: 2025-03-28

## 2025-03-28 RX ORDER — ALBUTEROL SULFATE 90 UG/1
2 INHALANT RESPIRATORY (INHALATION) EVERY 6 HOURS PRN
Qty: 6.7 G | Refills: 2 | Status: SHIPPED | OUTPATIENT
Start: 2025-03-28 | End: 2025-06-26

## 2025-03-28 RX ORDER — ERGOCALCIFEROL 1.25 MG/1
50000 CAPSULE ORAL
Qty: 12 CAPSULE | Refills: 3 | Status: SHIPPED | OUTPATIENT
Start: 2025-03-28

## 2025-03-28 RX ORDER — PRAVASTATIN SODIUM 20 MG/1
20 TABLET ORAL DAILY
Qty: 90 TABLET | Refills: 3 | Status: SHIPPED | OUTPATIENT
Start: 2025-03-28 | End: 2026-03-28

## 2025-03-28 RX ORDER — BUDESONIDE AND FORMOTEROL FUMARATE DIHYDRATE 160; 4.5 UG/1; UG/1
2 AEROSOL RESPIRATORY (INHALATION) 2 TIMES DAILY
Qty: 6 G | Refills: 0 | Status: SHIPPED | OUTPATIENT
Start: 2025-03-28 | End: 2025-04-27

## 2025-03-28 RX ORDER — LOSARTAN POTASSIUM AND HYDROCHLOROTHIAZIDE 12.5; 1 MG/1; MG/1
1 TABLET ORAL DAILY
Qty: 90 TABLET | Refills: 0 | Status: SHIPPED | OUTPATIENT
Start: 2025-03-28 | End: 2025-06-26

## 2025-03-28 RX ORDER — FUROSEMIDE 40 MG/1
40 TABLET ORAL 2 TIMES DAILY
Qty: 180 TABLET | Refills: 0 | Status: SHIPPED | OUTPATIENT
Start: 2025-03-28 | End: 2025-06-26

## 2025-03-28 RX ORDER — OMEPRAZOLE 20 MG/1
20 CAPSULE, DELAYED RELEASE ORAL DAILY
Qty: 90 CAPSULE | Refills: 6 | Status: SHIPPED | OUTPATIENT
Start: 2025-03-28